# Patient Record
Sex: MALE | Race: WHITE | NOT HISPANIC OR LATINO | Employment: OTHER | URBAN - METROPOLITAN AREA
[De-identification: names, ages, dates, MRNs, and addresses within clinical notes are randomized per-mention and may not be internally consistent; named-entity substitution may affect disease eponyms.]

---

## 2017-12-04 ENCOUNTER — OFFICE VISIT (OUTPATIENT)
Dept: CARDIOLOGY | Facility: CLINIC | Age: 72
End: 2017-12-04
Attending: NURSE PRACTITIONER
Payer: COMMERCIAL

## 2017-12-04 VITALS
WEIGHT: 219.3 LBS | SYSTOLIC BLOOD PRESSURE: 102 MMHG | HEIGHT: 68 IN | BODY MASS INDEX: 33.24 KG/M2 | DIASTOLIC BLOOD PRESSURE: 64 MMHG | HEART RATE: 62 BPM

## 2017-12-04 DIAGNOSIS — E78.00 PURE HYPERCHOLESTEROLEMIA: ICD-10-CM

## 2017-12-04 DIAGNOSIS — I21.09 AMI ANTERIOR WALL (H): ICD-10-CM

## 2017-12-04 DIAGNOSIS — I25.10 CORONARY ARTERY DISEASE INVOLVING NATIVE CORONARY ARTERY OF NATIVE HEART WITHOUT ANGINA PECTORIS: ICD-10-CM

## 2017-12-04 DIAGNOSIS — I21.02 ST ELEVATION MYOCARDIAL INFARCTION INVOLVING LEFT ANTERIOR DESCENDING (LAD) CORONARY ARTERY (H): ICD-10-CM

## 2017-12-04 DIAGNOSIS — Z95.5 S/P PRIMARY ANGIOPLASTY WITH CORONARY STENT: ICD-10-CM

## 2017-12-04 DIAGNOSIS — I25.10 CORONARY ARTERY DISEASE INVOLVING NATIVE HEART WITHOUT ANGINA PECTORIS, UNSPECIFIED VESSEL OR LESION TYPE: ICD-10-CM

## 2017-12-04 DIAGNOSIS — I25.10 CORONARY ARTERY DISEASE INVOLVING NATIVE CORONARY ARTERY OF NATIVE HEART WITHOUT ANGINA PECTORIS: Primary | ICD-10-CM

## 2017-12-04 DIAGNOSIS — I25.5 CARDIOMYOPATHY, ISCHEMIC: ICD-10-CM

## 2017-12-04 LAB
ANION GAP SERPL CALCULATED.3IONS-SCNC: 6 MMOL/L (ref 3–14)
BUN SERPL-MCNC: 9 MG/DL (ref 7–30)
CALCIUM SERPL-MCNC: 8.9 MG/DL (ref 8.5–10.1)
CHLORIDE SERPL-SCNC: 101 MMOL/L (ref 94–109)
CHOLEST SERPL-MCNC: 170 MG/DL
CO2 SERPL-SCNC: 29 MMOL/L (ref 20–32)
CREAT SERPL-MCNC: 0.96 MG/DL (ref 0.66–1.25)
GFR SERPL CREATININE-BSD FRML MDRD: 76 ML/MIN/1.7M2
GLUCOSE SERPL-MCNC: 108 MG/DL (ref 70–99)
HDLC SERPL-MCNC: 43 MG/DL
LDLC SERPL CALC-MCNC: 88 MG/DL
NONHDLC SERPL-MCNC: 127 MG/DL
POTASSIUM SERPL-SCNC: 4.1 MMOL/L (ref 3.4–5.3)
SODIUM SERPL-SCNC: 136 MMOL/L (ref 133–144)
TRIGL SERPL-MCNC: 193 MG/DL

## 2017-12-04 PROCEDURE — 80048 BASIC METABOLIC PNL TOTAL CA: CPT | Performed by: INTERNAL MEDICINE

## 2017-12-04 PROCEDURE — 99214 OFFICE O/P EST MOD 30 MIN: CPT | Performed by: INTERNAL MEDICINE

## 2017-12-04 PROCEDURE — 36415 COLL VENOUS BLD VENIPUNCTURE: CPT | Performed by: INTERNAL MEDICINE

## 2017-12-04 PROCEDURE — 80061 LIPID PANEL: CPT | Performed by: INTERNAL MEDICINE

## 2017-12-04 NOTE — MR AVS SNAPSHOT
After Visit Summary   12/4/2017    Lion Chaudhari    MRN: [id number]           Patient Information     Date Of Birth          1945        Visit Information        Provider Department      12/4/2017 9:30 AM Grayson Kincaid MD [facility]        Today's Diagnoses     Coronary artery disease involving native coronary artery of native heart without angina pectoris    -  1    Cardiomyopathy, ischemic        AMI anterior wall (H)        S/P primary angioplasty with coronary stent        Pure hypercholesterolemia           Follow-ups after your visit        Additional Services     Follow-Up with Cardiologist                 Future tests that were ordered for you today     Open Future Orders        Priority Expected Expires Ordered    Lipid Profile Routine 12/4/2018 12/5/2018 12/4/2017    Basic metabolic panel Routine 12/4/2018 12/5/2018 12/4/2017    Exercise Stress Echocardiogram Routine 12/4/2018 12/5/2018 12/4/2017    Follow-Up with Cardiologist Routine 12/4/2018 12/5/2018 12/4/2017            Who to contact     If you have questions or need follow up information about today's clinic visit or your schedule please contact [facility] directly at [phone].  Normal or non-critical lab and imaging results will be communicated to you by MyChart, letter or phone within 4 business days after the clinic has received the results. If you do not hear from us within 7 days, please contact the clinic through [employer] or phone. If you have a critical or abnormal lab result, we will notify you by phone as soon as possible.  Submit refill requests through [employer] or call your pharmacy and they will forward the refill request to us. Please allow 3 business days for your refill to be completed.          Additional Information About Your Visit        MyChart Information     [employer] gives you secure access to your electronic  "health record. If you see a primary care provider, you can also send messages to your care team and make appointments. If you have questions, please call your primary care clinic.  If you do not have a primary care provider, please call [phone] and they will assist you.        Care EveryWhere ID     This is your Care EveryWhere ID. This could be used by other organizations to access your [facility] medical records  [id number]        Your Vitals Were     Pulse Height BMI (Body Mass Index)             62 1.727 m (5' 8\") 33.34 kg/m2          Blood Pressure from Last 3 Encounters:   12/04/17 102/64   12/02/16 110/76   10/29/15 114/68    Weight from Last 3 Encounters:   12/04/17 99.5 kg (219 lb 4.8 oz)   12/02/16 99.9 kg (220 lb 4.8 oz)   10/29/15 99.3 kg (219 lb)              We Performed the Following     Follow-Up with Cardiologist        Primary Care Provider Office Phone # Fax #    Jarett Jose [phone]       [facility] [address] [place] [address]        Equal Access to Services     [facility] WALE : Hadii aad ku hadasho Soomaali, waaxda luqadaha, qaybta kaalmada adeegyada, waxay ankurin haysamueln adeel bar. So [facility] [phone].    ATENCIÓN: Si habla español, tiene a morel disposición servicios gratuitos de asistencia lingüística. Llame al [phone].    We comply with applicable federal civil rights laws and [place] laws. We do not discriminate on the basis of race, color, national origin, age, disability, sex, sexual orientation, or gender identity.            Thank you!     Thank you for choosing [facility]  for your care. Our goal is always to provide you with excellent care. Hearing back from our patients is one way we can continue to improve our services. Please take a few minutes to complete the written survey that you may receive in the mail after your visit with us. Thank you!             Your Updated " Medication List - Protect others around you: Learn how to safely use, store and throw away your medicines at www.disposemymeds.org.          This list is accurate as of: 12/4/17 10:24 AM.  Always use your most recent med list.                   Brand Name Dispense Instructions for use Diagnosis    ampicillin 500 MG capsule    PRINCIPEN     Take 500 mg by mouth 4 tablets prior to dental appointments        aspirin 81 MG EC tablet     90 tablet    Take 1 tablet by mouth daily.    AMI anterior wall (H)       atorvastatin 40 MG tablet    LIPITOR    90 tablet    Take 1 tablet (40 mg) by mouth daily    Coronary artery disease involving native heart without angina pectoris, unspecified vessel or lesion type       carvedilol 6.25 MG tablet    COREG    180 tablet    Take 1 tablet (6.25 mg) by mouth 2 times daily    AMI anterior wall (H)       lisinopril 2.5 MG tablet    PRINIVIL/Zestril    90 tablet    Take 1 tablet (2.5 mg) by mouth daily    AMI anterior wall (H)       nitroGLYcerin 0.4 MG sublingual tablet    NITROSTAT    25 tablet    Place 1 tablet (0.4 mg) under the tongue every 5 minutes as needed for chest pain    CAD (coronary artery disease)

## 2017-12-04 NOTE — LETTER
12/4/2017    Jarett Jose MD  [facility]   [address]   [place] [address]    RE: Lion Chaudhari       Dear Colleague,    I had the pleasure of seeing Lion Chaudhari in the [facility].    HPI and Plan:   This 72-year-old gentleman is seen in follow-up of his coronary artery disease, ischemic cardiac myopathy, dyslipidemia, and history of anterior MI in 2012.   He states he has continued to feel well this year. He remains off cigarettes completely. With activities, he is having no chest discomfort or other ischemic symptoms, no dyspnea and is without orthopnea, edema, palpitations, dizziness, or syncope. He denies myalgias or muscle weakness. He is without claudication or focal neurologic symptoms.   Estrace workouts were tailing off as he was having troubles with right hip pain.  He states that is now resolved and he is willing to go back to the gym and start working out more.  However when he does yard work or any projects that he wants to do he does not feel like he has limitations.    In 2012 he presented with an acute anterior myocardial infarction although somewhat late. He was taken emergently to the cath lab and had successful stenting and PCI of a culprit lesion in the mid LAD. He was left at that time with an ejection fraction around 40%-45% following that. At the time of angiography he had about a 70% marginal lesion and mild right coronary disease. He has not had symptomatic congestive heart failure ever.  He states he is tolerating his medications well.  He gained about 15-17 pounds in [place]/year.  He just recently embarked upon a better diet and has lost most of this.     Exam-full exam below.  In summary:  Blood pressure 102/64.  Pulse 60 and regular  , normal cardiopulmonary exam and no evidence of volume overload. Peripheral vascular exam is normal.  Full exam detailed below.    Follow-up labs are reviewed with him.  At one time his LDL was  down in the mid 70s.  It popped up to 97 last year and is now 88 this year.  I looked back on his outside labs and in 2008 his LDLs were running around 190 and total cholesterols around 280.  I have encouraged him to continue his current lifestyle and lose another 10 pounds and I think we will have his LDLs back in the 70s which is excellent profile given his baseline.  Electrolytes and renal function are stable and normal.     Impression/plan  1-coronary artery disease. Currently without ischemic symptoms. History of anterior MI treated with drug-eluting stent in 2012. Doing appropriate risk factor intervention as above. Have recommended continued current regimen.when he returns any year I will get a stress study set will be 6 years since his event.  2-ischemic cardiomyopathy. Ejection fraction around 45%. No heart failure, arrhythmia, or other symptoms, and no evidence of volume overload. He will continue on carvedilol and vasodilator therapy.   3-hyperlipidemia.  At  reasonablegoal on atorvastatin  4-risk factor intervention. Exercise program details were reviewed and reiterated.  Dietary reviews suggest a good understanding and a fairly good compliance with quality of diet. We discussed the importance of quantity also. He remains abstinent cigarettes. Blood pressure is controlled.        I will have him continue his current medical regimen.  I will have him back in one year for follow-up.  He knows to call and come in sooner if any new issues arise. Labs and stress study will be evaluated at that time.    Orders Placed This Encounter   Procedures     Lipid Profile     Basic metabolic panel     Follow-Up with Cardiologist     Exercise Stress Echocardiogram       No orders of the defined types were placed in this encounter.      There are no discontinued medications.      Encounter Diagnoses   Name Primary?     Coronary artery disease involving native coronary artery of native heart without angina pectoris Yes      Cardiomyopathy, ischemic      AMI anterior wall (H)      S/P primary angioplasty with coronary stent      Pure hypercholesterolemia        CURRENT MEDICATIONS:  Current Outpatient Prescriptions   Medication Sig Dispense Refill     carvedilol (COREG) 6.25 MG tablet Take 1 tablet (6.25 mg) by mouth 2 times daily 180 tablet 3     lisinopril (PRINIVIL/ZESTRIL) 2.5 MG tablet Take 1 tablet (2.5 mg) by mouth daily 90 tablet 3     atorvastatin (LIPITOR) 40 MG tablet Take 1 tablet (40 mg) by mouth daily 90 tablet 3     ampicillin (PRINCIPEN) 500 MG capsule Take 500 mg by mouth 4 tablets prior to dental appointments       nitroglycerin (NITROSTAT) 0.4 MG SL tablet Place 1 tablet (0.4 mg) under the tongue every 5 minutes as needed for chest pain 25 tablet 3     aspirin EC 81 MG EC tablet Take 1 tablet by mouth daily. 90 tablet 3       ALLERGIES     Allergies   Allergen Reactions     Clindamycin Hcl Rash     Warfarin Rash       PAST MEDICAL HISTORY:  Past Medical History:   Diagnosis Date     Cardiomyopathy, ischemic      Coronary artery disease     MI 6/12/12 with thrombectomy and drug eluding STENT to LAD     Hydrocele      Hyperlipidaemia      Myocardial infarction 6/2012    Anterior       PAST SURGICAL HISTORY:  Past Surgical History:   Procedure Laterality Date     HEART CATH, ANGIOPLASTY  6/2012    JULIA stenting to LAD     HEART CATH, ANGIOPLASTY  7/2012    taged cath JULIA stenting to CFX     HERNIA REPAIR       ORTHOPEDIC SURGERY      Left hip replacement       FAMILY HISTORY:  Family History   Problem Relation Age of Onset     Hypertension Father      Prostate Cancer Father      Ovarian Cancer Mother        SOCIAL HISTORY:  Social History     Social History     Marital status:      Spouse name: N/A     Number of children: N/A     Years of education: N/A     Social History Main Topics     Smoking status: Former Smoker     Quit date: 12/12/2012     Smokeless tobacco: Former User      Comment: Vaping currently      "Alcohol use 0.0 oz/week     0 Standard drinks or equivalent per week      Comment: occasionally     Drug use: No     Sexual activity: Yes     Partners: Female     Other Topics Concern     Caffeine Concern No     2-3 cups per day     Stress Concern No     Special Diet Yes     trying to eat healthier - lower carbs     Exercise Yes     walking-  3-4 times per week     Social History Narrative       Review of Systems:  Skin:  Negative       Eyes:  Positive for glasses reading glasses  ENT:  Positive for hearing loss;tinnitus;sinus trouble partial hearing loss, blows nose every am  Respiratory:  Positive for dyspnea on exertion     Cardiovascular:    Positive for;edema    Gastroenterology: Negative      Genitourinary:  Positive for urinary frequency;hesitancy    Musculoskeletal:  Positive for   left hip replace 2011  Neurologic:  Negative      Psychiatric:  Negative      Heme/Lymph/Imm:  Positive for allergies RX allergies - possible seasonal  Endocrine:  Negative        Physical Exam:  Vitals: /64 (BP Location: Right arm, Patient Position: Chair, Cuff Size: Adult Large)  Pulse 62  Ht 1.727 m (5' 8\")  Wt 99.5 kg (219 lb 4.8 oz)  BMI 33.34 kg/m2    Constitutional:  cooperative, alert and oriented, well developed, well nourished, in no acute distress        Skin:  warm and dry to the touch, no apparent skin lesions or masses noted          Head:  normocephalic, no masses or lesions        Eyes:  pupils equal and round;sclera white;EOMS intact;conjunctivae and lids unremarkable        Lymph:      ENT:  no pallor or cyanosis        Neck:  carotid pulses are full and equal bilaterally, JVP normal, no carotid bruit        Respiratory:  normal breath sounds, clear to auscultation, normal A-P diameter, normal symmetry, normal respiratory excursion, no use of accessory muscles         Cardiac: regular rhythm, normal S1/S2, no S3 or S4, apical impulse not displaced, no murmurs, gallops or rubs                pulses full " and equal                                        GI:  abdomen soft, non-tender, BS normoactive, no mass, no HSM, no bruits obese      Extremities and Muscular Skeletal:  no deformities, clubbing, cyanosis, erythema observed;no edema              Neurological:  no gross motor deficits;affect appropriate        Psych:  Alert and Oriented x 3      Thank you for allowing me to participate in the care of your patient.    Sincerely,     Grayson Kincaid MD     [facility]

## 2017-12-04 NOTE — PROGRESS NOTES
HPI and Plan:   This 72-year-old gentleman is seen in follow-up of his coronary artery disease, ischemic cardiac myopathy, dyslipidemia, and history of anterior MI in 2012.   He states he has continued to feel well this year. He remains off cigarettes completely. With activities, he is having no chest discomfort or other ischemic symptoms, no dyspnea and is without orthopnea, edema, palpitations, dizziness, or syncope. He denies myalgias or muscle weakness. He is without claudication or focal neurologic symptoms.  Estrace workouts were tailing off as he was having troubles with right hip pain.  He states that is now resolved and he is willing to go back to the gym and start working out more.  However when he does yard work or any projects that he wants to do he does not feel like he has limitations.    In 2012 he presented with an acute anterior myocardial infarction although somewhat late. He was taken emergently to the cath lab and had successful stenting and PCI of a culprit lesion in the mid LAD. He was left at that time with an ejection fraction around 40%-45% following that. At the time of angiography he had about a 70% marginal lesion and mild right coronary disease. He has not had symptomatic congestive heart failure ever.  He states he is tolerating his medications well.  He gained about 15-17 pounds in [place]/year.  He just recently embarked upon a better diet and has lost most of this.     Exam-full exam below.  In summary:  Blood pressure 102/64.  Pulse 60 and regular  , normal cardiopulmonary exam and no evidence of volume overload. Peripheral vascular exam is normal.  Full exam detailed below.    Follow-up labs are reviewed with him.  At one time his LDL was down in the mid 70s.  It popped up to 97 last year and is now 88 this year.  I looked back on his outside labs and in 2008 his LDLs were running around 190 and total cholesterols around 280.  I have encouraged him to continue his current lifestyle  and lose another 10 pounds and I think we will have his LDLs back in the 70s which is excellent profile given his baseline.  Electrolytes and renal function are stable and normal.     Impression/plan  1-coronary artery disease. Currently without ischemic symptoms. History of anterior MI treated with drug-eluting stent in 2012. Doing appropriate risk factor intervention as above. Have recommended continued current regimen.when he returns any year I will get a stress study set will be 6 years since his event.  2-ischemic cardiomyopathy. Ejection fraction around 45%. No heart failure, arrhythmia, or other symptoms, and no evidence of volume overload. He will continue on carvedilol and vasodilator therapy.   3-hyperlipidemia.  At reasonablegoal on atorvastatin  4-risk factor intervention. Exercise program details were reviewed and reiterated.  Dietary reviews suggest a good understanding and a fairly good compliance with quality of diet. We discussed the importance of quantity also. He remains abstinent cigarettes. Blood pressure is controlled.        I will have him continue his current medical regimen.  I will have him back in one year for follow-up.  He knows to call and come in sooner if any new issues arise. Labs and stress study will be evaluated at that time.    Orders Placed This Encounter   Procedures     Lipid Profile     Basic metabolic panel     Follow-Up with Cardiologist     Exercise Stress Echocardiogram       No orders of the defined types were placed in this encounter.      There are no discontinued medications.      Encounter Diagnoses   Name Primary?     Coronary artery disease involving native coronary artery of native heart without angina pectoris Yes     Cardiomyopathy, ischemic      AMI anterior wall (H)      S/P primary angioplasty with coronary stent      Pure hypercholesterolemia        CURRENT MEDICATIONS:  Current Outpatient Prescriptions   Medication Sig Dispense Refill     carvedilol  (COREG) 6.25 MG tablet Take 1 tablet (6.25 mg) by mouth 2 times daily 180 tablet 3     lisinopril (PRINIVIL/ZESTRIL) 2.5 MG tablet Take 1 tablet (2.5 mg) by mouth daily 90 tablet 3     atorvastatin (LIPITOR) 40 MG tablet Take 1 tablet (40 mg) by mouth daily 90 tablet 3     ampicillin (PRINCIPEN) 500 MG capsule Take 500 mg by mouth 4 tablets prior to dental appointments       nitroglycerin (NITROSTAT) 0.4 MG SL tablet Place 1 tablet (0.4 mg) under the tongue every 5 minutes as needed for chest pain 25 tablet 3     aspirin EC 81 MG EC tablet Take 1 tablet by mouth daily. 90 tablet 3       ALLERGIES     Allergies   Allergen Reactions     Clindamycin Hcl Rash     Warfarin Rash       PAST MEDICAL HISTORY:  Past Medical History:   Diagnosis Date     Cardiomyopathy, ischemic      Coronary artery disease     MI 6/12/12 with thrombectomy and drug eluding STENT to LAD     Hydrocele      Hyperlipidaemia      Myocardial infarction 6/2012    Anterior       PAST SURGICAL HISTORY:  Past Surgical History:   Procedure Laterality Date     HEART CATH, ANGIOPLASTY  6/2012    JULIA stenting to LAD     HEART CATH, ANGIOPLASTY  7/2012    taged cath JULIA stenting to CFX     HERNIA REPAIR       ORTHOPEDIC SURGERY      Left hip replacement       FAMILY HISTORY:  Family History   Problem Relation Age of Onset     Hypertension Father      Prostate Cancer Father      Ovarian Cancer Mother        SOCIAL HISTORY:  Social History     Social History     Marital status:      Spouse name: N/A     Number of children: N/A     Years of education: N/A     Social History Main Topics     Smoking status: Former Smoker     Quit date: 12/12/2012     Smokeless tobacco: Former User      Comment: Vaping currently     Alcohol use 0.0 oz/week     0 Standard drinks or equivalent per week      Comment: occasionally     Drug use: No     Sexual activity: Yes     Partners: Female     Other Topics Concern     Caffeine Concern No     2-3 cups per day     Stress  "Concern No     Special Diet Yes     trying to eat healthier - lower carbs     Exercise Yes     walking-  3-4 times per week     Social History Narrative       Review of Systems:  Skin:  Negative       Eyes:  Positive for glasses reading glasses  ENT:  Positive for hearing loss;tinnitus;sinus trouble partial hearing loss, blows nose every am  Respiratory:  Positive for dyspnea on exertion     Cardiovascular:    Positive for;edema    Gastroenterology: Negative      Genitourinary:  Positive for urinary frequency;hesitancy    Musculoskeletal:  Positive for   left hip replace 2011  Neurologic:  Negative      Psychiatric:  Negative      Heme/Lymph/Imm:  Positive for allergies RX allergies - possible seasonal  Endocrine:  Negative        Physical Exam:  Vitals: /64 (BP Location: Right arm, Patient Position: Chair, Cuff Size: Adult Large)  Pulse 62  Ht 1.727 m (5' 8\")  Wt 99.5 kg (219 lb 4.8 oz)  BMI 33.34 kg/m2    Constitutional:  cooperative, alert and oriented, well developed, well nourished, in no acute distress        Skin:  warm and dry to the touch, no apparent skin lesions or masses noted          Head:  normocephalic, no masses or lesions        Eyes:  pupils equal and round;sclera white;EOMS intact;conjunctivae and lids unremarkable        Lymph:      ENT:  no pallor or cyanosis        Neck:  carotid pulses are full and equal bilaterally, JVP normal, no carotid bruit        Respiratory:  normal breath sounds, clear to auscultation, normal A-P diameter, normal symmetry, normal respiratory excursion, no use of accessory muscles         Cardiac: regular rhythm, normal S1/S2, no S3 or S4, apical impulse not displaced, no murmurs, gallops or rubs                pulses full and equal                                        GI:  abdomen soft, non-tender, BS normoactive, no mass, no HSM, no bruits obese      Extremities and Muscular Skeletal:  no deformities, clubbing, cyanosis, erythema observed;no edema         "      Neurological:  no gross motor deficits;affect appropriate        Psych:  Alert and Oriented x 3        CC  ELSA Eugene CNP  [address]  [place] [address]

## 2017-12-22 DIAGNOSIS — I21.09 AMI ANTERIOR WALL (H): ICD-10-CM

## 2017-12-22 DIAGNOSIS — I25.10 CORONARY ARTERY DISEASE INVOLVING NATIVE HEART WITHOUT ANGINA PECTORIS, UNSPECIFIED VESSEL OR LESION TYPE: ICD-10-CM

## 2017-12-22 RX ORDER — CARVEDILOL 6.25 MG/1
6.25 TABLET ORAL 2 TIMES DAILY
Qty: 180 TABLET | Refills: 3 | Status: SHIPPED | OUTPATIENT
Start: 2017-12-22 | End: 2018-12-12

## 2017-12-22 RX ORDER — LISINOPRIL 2.5 MG/1
2.5 TABLET ORAL DAILY
Qty: 90 TABLET | Refills: 3 | Status: SHIPPED | OUTPATIENT
Start: 2017-12-22 | End: 2018-12-12

## 2017-12-22 RX ORDER — ATORVASTATIN CALCIUM 40 MG/1
40 TABLET, FILM COATED ORAL DAILY
Qty: 90 TABLET | Refills: 3 | Status: SHIPPED | OUTPATIENT
Start: 2017-12-22 | End: 2018-12-19

## 2017-12-22 NOTE — TELEPHONE ENCOUNTER
Medication Refilled: atorvastatin and carvedilol - received fax refill request  Last office visit: 12/4/17  Next office visit: 12/2018  Pharmacy sent to: [facility]  Amanda NATHAN

## 2018-12-12 DIAGNOSIS — I21.09 AMI ANTERIOR WALL (H): ICD-10-CM

## 2018-12-12 RX ORDER — CARVEDILOL 6.25 MG/1
6.25 TABLET ORAL 2 TIMES DAILY
Qty: 180 TABLET | Refills: 1 | Status: SHIPPED | OUTPATIENT
Start: 2018-12-12 | End: 2019-03-01

## 2018-12-12 RX ORDER — LISINOPRIL 2.5 MG/1
2.5 TABLET ORAL DAILY
Qty: 90 TABLET | Refills: 1 | Status: SHIPPED | OUTPATIENT
Start: 2018-12-12 | End: 2019-03-01

## 2018-12-19 DIAGNOSIS — I25.10 CORONARY ARTERY DISEASE INVOLVING NATIVE HEART WITHOUT ANGINA PECTORIS, UNSPECIFIED VESSEL OR LESION TYPE: ICD-10-CM

## 2018-12-19 RX ORDER — ATORVASTATIN CALCIUM 40 MG/1
40 TABLET, FILM COATED ORAL DAILY
Qty: 90 TABLET | Refills: 0 | Status: SHIPPED | OUTPATIENT
Start: 2018-12-19 | End: 2019-03-01

## 2019-03-01 ENCOUNTER — OFFICE VISIT (OUTPATIENT)
Dept: CARDIOLOGY | Facility: CLINIC | Age: 74
End: 2019-03-01
Payer: COMMERCIAL

## 2019-03-01 ENCOUNTER — APPOINTMENT (OUTPATIENT)
Dept: LAB | Facility: CLINIC | Age: 74
End: 2019-03-01
Payer: COMMERCIAL

## 2019-03-01 VITALS
HEIGHT: 68 IN | WEIGHT: 219.2 LBS | DIASTOLIC BLOOD PRESSURE: 64 MMHG | BODY MASS INDEX: 33.22 KG/M2 | SYSTOLIC BLOOD PRESSURE: 110 MMHG | HEART RATE: 60 BPM

## 2019-03-01 DIAGNOSIS — I25.5 CARDIOMYOPATHY, ISCHEMIC: ICD-10-CM

## 2019-03-01 DIAGNOSIS — I25.10 CORONARY ARTERY DISEASE INVOLVING NATIVE CORONARY ARTERY OF NATIVE HEART WITHOUT ANGINA PECTORIS: ICD-10-CM

## 2019-03-01 DIAGNOSIS — E78.00 PURE HYPERCHOLESTEROLEMIA: ICD-10-CM

## 2019-03-01 DIAGNOSIS — I87.2 VENOUS (PERIPHERAL) INSUFFICIENCY: ICD-10-CM

## 2019-03-01 DIAGNOSIS — I25.10 CORONARY ARTERY DISEASE INVOLVING NATIVE HEART WITHOUT ANGINA PECTORIS, UNSPECIFIED VESSEL OR LESION TYPE: Primary | ICD-10-CM

## 2019-03-01 DIAGNOSIS — I21.09 AMI ANTERIOR WALL (H): ICD-10-CM

## 2019-03-01 LAB
ANION GAP SERPL CALCULATED.3IONS-SCNC: 5 MMOL/L (ref 3–14)
BUN SERPL-MCNC: 14 MG/DL (ref 7–30)
CALCIUM SERPL-MCNC: 8.5 MG/DL (ref 8.5–10.1)
CHLORIDE SERPL-SCNC: 107 MMOL/L (ref 94–109)
CHOLEST SERPL-MCNC: 167 MG/DL
CO2 SERPL-SCNC: 27 MMOL/L (ref 20–32)
CREAT SERPL-MCNC: 0.78 MG/DL (ref 0.66–1.25)
GFR SERPL CREATININE-BSD FRML MDRD: 88 ML/MIN/{1.73_M2}
GLUCOSE SERPL-MCNC: 107 MG/DL (ref 70–99)
HDLC SERPL-MCNC: 40 MG/DL
LDLC SERPL CALC-MCNC: 85 MG/DL
NONHDLC SERPL-MCNC: 127 MG/DL
POTASSIUM SERPL-SCNC: 4.2 MMOL/L (ref 3.4–5.3)
SODIUM SERPL-SCNC: 139 MMOL/L (ref 133–144)
TRIGL SERPL-MCNC: 211 MG/DL

## 2019-03-01 PROCEDURE — 80048 BASIC METABOLIC PNL TOTAL CA: CPT | Performed by: INTERNAL MEDICINE

## 2019-03-01 PROCEDURE — 99214 OFFICE O/P EST MOD 30 MIN: CPT | Performed by: INTERNAL MEDICINE

## 2019-03-01 PROCEDURE — 80061 LIPID PANEL: CPT | Performed by: INTERNAL MEDICINE

## 2019-03-01 PROCEDURE — 36415 COLL VENOUS BLD VENIPUNCTURE: CPT | Performed by: INTERNAL MEDICINE

## 2019-03-01 RX ORDER — CARVEDILOL 6.25 MG/1
6.25 TABLET ORAL 2 TIMES DAILY
Qty: 180 TABLET | Refills: 3 | Status: SHIPPED | OUTPATIENT
Start: 2019-03-01 | End: 2020-04-02

## 2019-03-01 RX ORDER — ATORVASTATIN CALCIUM 40 MG/1
40 TABLET, FILM COATED ORAL DAILY
Qty: 90 TABLET | Refills: 3 | Status: SHIPPED | OUTPATIENT
Start: 2019-03-01 | End: 2020-04-02

## 2019-03-01 RX ORDER — LISINOPRIL 2.5 MG/1
2.5 TABLET ORAL DAILY
Qty: 90 TABLET | Refills: 3 | Status: SHIPPED | OUTPATIENT
Start: 2019-03-01 | End: 2020-04-02

## 2019-03-01 ASSESSMENT — MIFFLIN-ST. JEOR: SCORE: 1703.78

## 2019-03-01 NOTE — LETTER
3/1/2019    Jarett Jose MD  [facility] [address]   [place] [address]    RE: Lion Chaudhari       Dear Colleague,    I had the pleasure of seeing Lion AJ Chaudhari in the [facility].    HPI and Plan:   This 73-year-old gentleman is seen in follow-up of his coronary artery disease, ischemic cardiomyopathy, dyslipidemia, and history of anterior MI in 2012.     He states he has continued to feel well this year.  Last year he injured his shoulder and required repair.  Prior to that his surgeon requested a stress study and at [facility] he had a stress nuclear study which showed previously known small anterior infarct, no evidence of ischemia, ejection fraction actually greater than 50%.  He had uncomplicated surgery following that.  He is continued to have very slow rehab of his shoulder.    He remains off cigarettes completely. With activities, he is having no chest discomfort or other ischemic symptoms, no dyspnea and is without orthopnea, edema, palpitations, dizziness, or syncope. He denies myalgias or muscle weakness. He is without claudication or focal neurologic symptoms.   He is noting discoloration in his ankles although he is not sure he is having swelling.  He is not having pain.  He notes that he has had some recent scratches and trauma and is taken a long time to heal in his lower pretibial region but he is not having evidence of infections.     In 2012 he presented with an acute anterior myocardial infarction although somewhat late. He was taken emergently to the cath lab and had successful stenting and PCI of a culprit lesion in the mid LAD. He was left at that time with an ejection fraction around 40%-45% following that. At the time of angiography he also had a significant/severe proximal circumflex lesion.  There was only mild right coronary disease.    He subsequent had staged stenting of the proximal circumflex.  He has not had symptomatic  congestive heart failure ever.  He states he is tolerating his medications well.       Exam-full exam below.  In summary:  Blood pressure 110/64 Pulse 60 and regular  -normal cardiopulmonary exam and no evidence of volume overload. Peripheral vascular exam is normal.  -There are chronic stasis changes pretibially bilaterally two thirds of the way.  There is underlying 2+ pitting edema FPC up bilaterally.  No ulcerations or cellulitis.  Full exam detailed below.     Laboratory studies today show his electrolytes and renal function remain normal.  Lipids show LDL in the mid 80s which is mostly where he has been for the last few years.  Triglycerides are mildly elevated.  We did discuss triglyceride lowering diet and lifestyle.  He understands importance of weight loss.  He is juicing now and getting lots of fiber from the way he describes it.     Impression/plan    1-coronary artery disease. Currently without ischemic symptoms. History of anterior MI treated with drug-eluting stent in 2012. Doing appropriate risk factor intervention as above. Have recommended continued current regimen.negative stress study in 2017.    2-ischemic cardiomyopathy. Ejection fraction around 45-50 %. No heart failure, arrhythmia, or other symptoms, and no evidence of volume overload. He will continue on carvedilol and vasodilator therapy.     3-hyperlipidemia.  At reasonablegoal on atorvastatin.  Triglyceride lowering approach was recommended and discussed.    4-risk factor intervention. Exercise program details were reviewed and reiterated.  Dietary reviews suggest a good understanding and a fairly good compliance with quality of diet. We discussed the importance of quantity also. He remains abstinent cigarettes. Blood pressure is controlled.    5-peripheral venous insufficiency.  Significant skin changes now.  We spent extensive time discussing control with compression stockings and I gave him a number of resources for this.      I  will have him continue his current medical regimen and have renewed his cardiovascular medications for the next year.  I will have him back in one year for follow-up.  He knows to call and come in sooner if any new issues arise.    Orders Placed This Encounter   Procedures     Basic metabolic panel     Lipid Profile     ALT     Follow-Up with Cardiologist       Orders Placed This Encounter   Medications     carvedilol (COREG) 6.25 MG tablet     Sig: Take 1 tablet (6.25 mg) by mouth 2 times daily     Dispense:  180 tablet     Refill:  3     lisinopril (PRINIVIL/ZESTRIL) 2.5 MG tablet     Sig: Take 1 tablet (2.5 mg) by mouth daily     Dispense:  90 tablet     Refill:  3     atorvastatin (LIPITOR) 40 MG tablet     Sig: Take 1 tablet (40 mg) by mouth daily     Dispense:  90 tablet     Refill:  3       Medications Discontinued During This Encounter   Medication Reason     carvedilol (COREG) 6.25 MG tablet Reorder     lisinopril (PRINIVIL/ZESTRIL) 2.5 MG tablet Reorder     atorvastatin (LIPITOR) 40 MG tablet Reorder         Encounter Diagnoses   Name Primary?     Coronary artery disease involving native heart without angina pectoris, unspecified vessel or lesion type Yes     Cardiomyopathy, ischemic      Pure hypercholesterolemia      AMI anterior wall (H)      Venous (peripheral) insufficiency        CURRENT MEDICATIONS:  Current Outpatient Medications   Medication Sig Dispense Refill     ampicillin (PRINCIPEN) 500 MG capsule Take 500 mg by mouth 4 tablets prior to dental appointments       aspirin EC 81 MG EC tablet Take 1 tablet by mouth daily. 90 tablet 3     atorvastatin (LIPITOR) 40 MG tablet Take 1 tablet (40 mg) by mouth daily 90 tablet 3     carvedilol (COREG) 6.25 MG tablet Take 1 tablet (6.25 mg) by mouth 2 times daily 180 tablet 3     lisinopril (PRINIVIL/ZESTRIL) 2.5 MG tablet Take 1 tablet (2.5 mg) by mouth daily 90 tablet 3     nitroglycerin (NITROSTAT) 0.4 MG SL tablet Place 1 tablet (0.4 mg) under the  tongue every 5 minutes as needed for chest pain 25 tablet 3       ALLERGIES     Allergies   Allergen Reactions     Clindamycin Hcl Rash     Hydrocodone-Acetaminophen Itching     Warfarin Rash       PAST MEDICAL HISTORY:  Past Medical History:   Diagnosis Date     AMI anterior wall (H) 2012     CAD (coronary artery disease) 2012 - Anterior MI with Thrombectomy, PCI and JULIA to LAD 2012 - PCI and JULIA to LCx      Cardiomyopathy, ischemic      Coronary artery disease     MI 12 with thrombectomy and drug eluding STENT to LAD     Hydrocele      Hyperlipidaemia      Hyperlipidemia      Diagnosis updated by automated process. Provider to review and confirm.     Myocardial infarction (H) 2012    Anterior     S/P primary angioplasty with coronary stent 2012       PAST SURGICAL HISTORY:  Past Surgical History:   Procedure Laterality Date     HEART CATH, ANGIOPLASTY  2012    JULIA stenting to LAD     HEART CATH, ANGIOPLASTY  2012    taged cath JULIA stenting to CFX     HERNIA REPAIR       ORTHOPEDIC SURGERY      Left hip replacement       FAMILY HISTORY:  Family History   Problem Relation Age of Onset     Hypertension Father      Prostate Cancer Father      Ovarian Cancer Mother        SOCIAL HISTORY:  Social History     Socioeconomic History     Marital status:      Spouse name: None     Number of children: None     Years of education: None     Highest education level: None   Occupational History     None   Social Needs     Financial resource strain: None     Food insecurity:     Worry: None     Inability: None     Transportation needs:     Medical: None     Non-medical: None   Tobacco Use     Smoking status: Former Smoker     Last attempt to quit: 2012     Years since quittin.2     Smokeless tobacco: Former User     Tobacco comment: Vaping currently   Substance and Sexual Activity     Alcohol use: Yes     Alcohol/week: 0.0 oz     Comment: occasionally     Drug use: No      "Sexual activity: Yes     Partners: Female   Lifestyle     Physical activity:     Days per week: None     Minutes per session: None     Stress: None   Relationships     Social connections:     Talks on phone: None     Gets together: None     Attends Pentecostalism service: None     Active member of club or organization: None     Attends meetings of clubs or organizations: None     Relationship status: None     Intimate partner violence:     Fear of current or ex partner: None     Emotionally abused: None     Physically abused: None     Forced sexual activity: None   Other Topics Concern     Parent/sibling w/ CABG, MI or angioplasty before 65F 55M? Not Asked      Service Not Asked     Blood Transfusions Not Asked     Caffeine Concern No     Comment: 2-3 cups per day     Occupational Exposure Not Asked     Hobby Hazards Not Asked     Sleep Concern Not Asked     Stress Concern No     Weight Concern Not Asked     Special Diet Yes     Comment: trying to eat healthier - lower carbs     Back Care Not Asked     Exercise Yes     Comment: walking-  3-4 times per week     Bike Helmet Not Asked     Seat Belt Not Asked     Self-Exams Not Asked   Social History Narrative     None       Review of Systems:  Skin:  Negative       Eyes:  Positive for glasses reading glasses  ENT:  Positive for hearing loss    Respiratory:  Negative       Cardiovascular:  Negative      Gastroenterology: Negative      Genitourinary:         Musculoskeletal:  Positive for back pain;neck pain;joint pain;arthritis    Neurologic:  Negative      Psychiatric:  Negative      Heme/Lymph/Imm:  Positive for allergies RX allergies - possible seasonal  Endocrine:  Negative        Physical Exam:  Vitals: /64 (BP Location: Right arm, Patient Position: Sitting, Cuff Size: Adult Large)   Pulse 60   Ht 1.727 m (5' 8\")   Wt 99.4 kg (219 lb 3.2 oz)   BMI 33.33 kg/m       Constitutional:  cooperative, alert and oriented, well developed, well nourished, in no " acute distress        Skin:  warm and dry to the touch, no apparent skin lesions or masses noted          Head:  normocephalic, no masses or lesions        Eyes:  pupils equal and round;sclera white;EOMS intact;conjunctivae and lids unremarkable        Lymph:      ENT:  no pallor or cyanosis        Neck:  carotid pulses are full and equal bilaterally, JVP normal, no carotid bruit        Respiratory:  normal breath sounds, clear to auscultation, normal A-P diameter, normal symmetry, normal respiratory excursion, no use of accessory muscles         Cardiac: regular rhythm, normal S1/S2, no S3 or S4, apical impulse not displaced, no murmurs, gallops or rubs                pulses full and equal                                        GI:  abdomen soft;no HSM;non-tender;no bruits obese      Extremities and Muscular Skeletal:  no deformities, clubbing, cyanosis, erythema observed stasis pigmentation bilateral LE edema;pitting;2+          Neurological:  no gross motor deficits;affect appropriate        Psych:  Alert and Oriented x 3          Thank you for allowing me to participate in the care of your patient.    Sincerely,     Grayson Kincaid MD     [facility]

## 2019-03-01 NOTE — LETTER
3/1/2019    Jarett Jose MD  [facility] [address]   [place] [address]    RE: Lion Chaudhari       Dear Colleague,    I had the pleasure of seeing Lion AJ Chaudhari in the [facility].    HPI and Plan:   This 73-year-old gentleman is seen in follow-up of his coronary artery disease, ischemic cardiomyopathy, dyslipidemia, and history of anterior MI in 2012.     He states he has continued to feel well this year.  Last year he injured his shoulder and required repair.  Prior to that his surgeon requested a stress study and at [facility] [place] operated by [facility] he had a stress nuclear study which showed previously known small anterior infarct, no evidence of ischemia, ejection fraction actually greater than 50%.  He had uncomplicated surgery following that.  He is continued to have very slow rehab of his shoulder.    He remains off cigarettes completely. With activities, he is having no chest discomfort or other ischemic symptoms, no dyspnea and is without orthopnea, edema, palpitations, dizziness, or syncope. He denies myalgias or muscle weakness. He is without claudication or focal neurologic symptoms.   He is noting discoloration in his ankles although he is not sure he is having swelling.  He is not having pain.  He notes that he has had some recent scratches and trauma and is taken a long time to heal in his lower pretibial region but he is not having evidence of infections.     In 2012 he presented with an acute anterior myocardial infarction although somewhat late. He was taken emergently to the cath lab and had successful stenting and PCI of a culprit lesion in the mid LAD. He was left at that time with an ejection fraction around 40%-45% following that. At the time of angiography he also had a significant/severe proximal circumflex lesion.  There was only mild right coronary disease.    He subsequent had staged stenting of the proximal circumflex.  He has not had symptomatic  congestive heart failure ever.  He states he is tolerating his medications well.       Exam-full exam below.  In summary:  Blood pressure 110/64 Pulse 60 and regular  -normal cardiopulmonary exam and no evidence of volume overload. Peripheral vascular exam is normal.  -There are chronic stasis changes pretibially bilaterally two thirds of the way.  There is underlying 2+ pitting edema CHCF up bilaterally.  No ulcerations or cellulitis.  Full exam detailed below.     Laboratory studies today show his electrolytes and renal function remain normal.  Lipids show LDL in the mid 80s which is mostly where he has been for the last few years.  Triglycerides are mildly elevated.  We did discuss triglyceride lowering diet and lifestyle.  He understands importance of weight loss.  He is juicing now and getting lots of fiber from the way he describes it.     Impression/plan    1-coronary artery disease. Currently without ischemic symptoms. History of anterior MI treated with drug-eluting stent in 2012. Doing appropriate risk factor intervention as above. Have recommended continued current regimen.negative stress study in 2017.    2-ischemic cardiomyopathy. Ejection fraction around 45-50 %. No heart failure, arrhythmia, or other symptoms, and no evidence of volume overload. He will continue on carvedilol and vasodilator therapy.     3-hyperlipidemia.  At reasonablegoal on atorvastatin.  Triglyceride lowering approach was recommended and discussed.    4-risk factor intervention. Exercise program details were reviewed and reiterated.  Dietary reviews suggest a good understanding and a fairly good compliance with quality of diet. We discussed the importance of quantity also. He remains abstinent cigarettes. Blood pressure is controlled.    5-peripheral venous insufficiency.  Significant skin changes now.  We spent extensive time discussing control with compression stockings and I gave him a number of resources for this.      I  will have him continue his current medical regimen and have renewed his cardiovascular medications for the next year.  I will have him back in one year for follow-up.  He knows to call and come in sooner if any new issues arise.    Orders Placed This Encounter   Procedures     Basic metabolic panel     Lipid Profile     ALT     Follow-Up with Cardiologist       Orders Placed This Encounter   Medications     carvedilol (COREG) 6.25 MG tablet     Sig: Take 1 tablet (6.25 mg) by mouth 2 times daily     Dispense:  180 tablet     Refill:  3     lisinopril (PRINIVIL/ZESTRIL) 2.5 MG tablet     Sig: Take 1 tablet (2.5 mg) by mouth daily     Dispense:  90 tablet     Refill:  3     atorvastatin (LIPITOR) 40 MG tablet     Sig: Take 1 tablet (40 mg) by mouth daily     Dispense:  90 tablet     Refill:  3       Medications Discontinued During This Encounter   Medication Reason     carvedilol (COREG) 6.25 MG tablet Reorder     lisinopril (PRINIVIL/ZESTRIL) 2.5 MG tablet Reorder     atorvastatin (LIPITOR) 40 MG tablet Reorder         Encounter Diagnoses   Name Primary?     Coronary artery disease involving native heart without angina pectoris, unspecified vessel or lesion type Yes     Cardiomyopathy, ischemic      Pure hypercholesterolemia      AMI anterior wall (H)      Venous (peripheral) insufficiency        CURRENT MEDICATIONS:  Current Outpatient Medications   Medication Sig Dispense Refill     ampicillin (PRINCIPEN) 500 MG capsule Take 500 mg by mouth 4 tablets prior to dental appointments       aspirin EC 81 MG EC tablet Take 1 tablet by mouth daily. 90 tablet 3     atorvastatin (LIPITOR) 40 MG tablet Take 1 tablet (40 mg) by mouth daily 90 tablet 3     carvedilol (COREG) 6.25 MG tablet Take 1 tablet (6.25 mg) by mouth 2 times daily 180 tablet 3     lisinopril (PRINIVIL/ZESTRIL) 2.5 MG tablet Take 1 tablet (2.5 mg) by mouth daily 90 tablet 3     nitroglycerin (NITROSTAT) 0.4 MG SL tablet Place 1 tablet (0.4 mg) under the  tongue every 5 minutes as needed for chest pain 25 tablet 3       ALLERGIES     Allergies   Allergen Reactions     Clindamycin Hcl Rash     Hydrocodone-Acetaminophen Itching     Warfarin Rash       PAST MEDICAL HISTORY:  Past Medical History:   Diagnosis Date     AMI anterior wall (H) 2012     CAD (coronary artery disease) 2012 - Anterior MI with Thrombectomy, PCI and JULIA to LAD 2012 - PCI and JULIA to LCx      Cardiomyopathy, ischemic      Coronary artery disease     MI 12 with thrombectomy and drug eluding STENT to LAD     Hydrocele      Hyperlipidaemia      Hyperlipidemia      Diagnosis updated by automated process. Provider to review and confirm.     Myocardial infarction (H) 2012    Anterior     S/P primary angioplasty with coronary stent 2012       PAST SURGICAL HISTORY:  Past Surgical History:   Procedure Laterality Date     HEART CATH, ANGIOPLASTY  2012    JULIA stenting to LAD     HEART CATH, ANGIOPLASTY  2012    taged cath JULIA stenting to CFX     HERNIA REPAIR       ORTHOPEDIC SURGERY      Left hip replacement       FAMILY HISTORY:  Family History   Problem Relation Age of Onset     Hypertension Father      Prostate Cancer Father      Ovarian Cancer Mother        SOCIAL HISTORY:  Social History     Socioeconomic History     Marital status:      Spouse name: None     Number of children: None     Years of education: None     Highest education level: None   Occupational History     None   Social Needs     Financial resource strain: None     Food insecurity:     Worry: None     Inability: None     Transportation needs:     Medical: None     Non-medical: None   Tobacco Use     Smoking status: Former Smoker     Last attempt to quit: 2012     Years since quittin.2     Smokeless tobacco: Former User     Tobacco comment: Vaping currently   Substance and Sexual Activity     Alcohol use: Yes     Alcohol/week: 0.0 oz     Comment: occasionally     Drug use: No      "Sexual activity: Yes     Partners: Female   Lifestyle     Physical activity:     Days per week: None     Minutes per session: None     Stress: None   Relationships     Social connections:     Talks on phone: None     Gets together: None     Attends Mandaeism service: None     Active member of club or organization: None     Attends meetings of clubs or organizations: None     Relationship status: None     Intimate partner violence:     Fear of current or ex partner: None     Emotionally abused: None     Physically abused: None     Forced sexual activity: None   Other Topics Concern     Parent/sibling w/ CABG, MI or angioplasty before 65F 55M? Not Asked      Service Not Asked     Blood Transfusions Not Asked     Caffeine Concern No     Comment: 2-3 cups per day     Occupational Exposure Not Asked     Hobby Hazards Not Asked     Sleep Concern Not Asked     Stress Concern No     Weight Concern Not Asked     Special Diet Yes     Comment: trying to eat healthier - lower carbs     Back Care Not Asked     Exercise Yes     Comment: walking-  3-4 times per week     Bike Helmet Not Asked     Seat Belt Not Asked     Self-Exams Not Asked   Social History Narrative     None       Review of Systems:  Skin:  Negative       Eyes:  Positive for glasses reading glasses  ENT:  Positive for hearing loss    Respiratory:  Negative       Cardiovascular:  Negative      Gastroenterology: Negative      Genitourinary:         Musculoskeletal:  Positive for back pain;neck pain;joint pain;arthritis    Neurologic:  Negative      Psychiatric:  Negative      Heme/Lymph/Imm:  Positive for allergies RX allergies - possible seasonal  Endocrine:  Negative        Physical Exam:  Vitals: /64 (BP Location: Right arm, Patient Position: Sitting, Cuff Size: Adult Large)   Pulse 60   Ht 1.727 m (5' 8\")   Wt 99.4 kg (219 lb 3.2 oz)   BMI 33.33 kg/m       Constitutional:  cooperative, alert and oriented, well developed, well nourished, in no " acute distress        Skin:  warm and dry to the touch, no apparent skin lesions or masses noted          Head:  normocephalic, no masses or lesions        Eyes:  pupils equal and round;sclera white;EOMS intact;conjunctivae and lids unremarkable        Lymph:      ENT:  no pallor or cyanosis        Neck:  carotid pulses are full and equal bilaterally, JVP normal, no carotid bruit        Respiratory:  normal breath sounds, clear to auscultation, normal A-P diameter, normal symmetry, normal respiratory excursion, no use of accessory muscles         Cardiac: regular rhythm, normal S1/S2, no S3 or S4, apical impulse not displaced, no murmurs, gallops or rubs                pulses full and equal                                        GI:  abdomen soft;no HSM;non-tender;no bruits obese      Extremities and Muscular Skeletal:  no deformities, clubbing, cyanosis, erythema observed stasis pigmentation bilateral LE edema;pitting;2+          Neurological:  no gross motor deficits;affect appropriate        Psych:  Alert and Oriented x 3        CC  Jarett Jose MD  [facility]  [address] [place] [address]                Thank you for allowing me to participate in the care of your patient.      Sincerely,     Grayson Kincaid MD     [facility]    cc:   Jarett Jose MD  [facility]  [address] [place] [address]

## 2019-03-01 NOTE — PROGRESS NOTES
HPI and Plan:   This 73-year-old gentleman is seen in follow-up of his coronary artery disease, ischemic cardiomyopathy, dyslipidemia, and history of anterior MI in 2012.     He states he has continued to feel well this year.  Last year he injured his shoulder and required repair.  Prior to that his surgeon requested a stress study and at [facility] he had a stress nuclear study which showed previously known small anterior infarct, no evidence of ischemia, ejection fraction actually greater than 50%.  He had uncomplicated surgery following that.  He is continued to have very slow rehab of his shoulder.    He remains off cigarettes completely. With activities, he is having no chest discomfort or other ischemic symptoms, no dyspnea and is without orthopnea, edema, palpitations, dizziness, or syncope. He denies myalgias or muscle weakness. He is without claudication or focal neurologic symptoms.   He is noting discoloration in his ankles although he is not sure he is having swelling.  He is not having pain.  He notes that he has had some recent scratches and trauma and is taken a long time to heal in his lower pretibial region but he is not having evidence of infections.     In 2012 he presented with an acute anterior myocardial infarction although somewhat late. He was taken emergently to the cath lab and had successful stenting and PCI of a culprit lesion in the mid LAD. He was left at that time with an ejection fraction around 40%-45% following that. At the time of angiography he also had a significant/severe proximal circumflex lesion.  There was only mild right coronary disease.   He subsequent had staged stenting of the proximal circumflex.  He has not had symptomatic congestive heart failure ever.  He states he is tolerating his medications well.       Exam-full exam below.  In summary:  Blood pressure 110/64 Pulse 60 and regular  -normal cardiopulmonary exam and no evidence of volume overload. Peripheral  vascular exam is normal.  -There are chronic stasis changes pretibially bilaterally two thirds of the way.  There is underlying 2+ pitting edema FDC up bilaterally.  No ulcerations or cellulitis.  Full exam detailed below.     Laboratory studies today show his electrolytes and renal function remain normal.  Lipids show LDL in the mid 80s which is mostly where he has been for the last few years.  Triglycerides are mildly elevated.  We did discuss triglyceride lowering diet and lifestyle.  He understands importance of weight loss.  He is juicing now and getting lots of fiber from the way he describes it.     Impression/plan    1-coronary artery disease. Currently without ischemic symptoms. History of anterior MI treated with drug-eluting stent in 2012. Doing appropriate risk factor intervention as above. Have recommended continued current regimen.negative stress study in 2017.    2-ischemic cardiomyopathy. Ejection fraction around 45-50 %. No heart failure, arrhythmia, or other symptoms, and no evidence of volume overload. He will continue on carvedilol and vasodilator therapy.     3-hyperlipidemia.  At reasonablegoal on atorvastatin.  Triglyceride lowering approach was recommended and discussed.    4-risk factor intervention. Exercise program details were reviewed and reiterated.  Dietary reviews suggest a good understanding and a fairly good compliance with quality of diet. We discussed the importance of quantity also. He remains abstinent cigarettes. Blood pressure is controlled.    5-peripheral venous insufficiency.  Significant skin changes now.  We spent extensive time discussing control with compression stockings and I gave him a number of resources for this.      I will have him continue his current medical regimen and have renewed his cardiovascular medications for the next year.  I will have him back in one year for follow-up.  He knows to call and come in sooner if any new issues arise.    Orders Placed  This Encounter   Procedures     Basic metabolic panel     Lipid Profile     ALT     Follow-Up with Cardiologist       Orders Placed This Encounter   Medications     carvedilol (COREG) 6.25 MG tablet     Sig: Take 1 tablet (6.25 mg) by mouth 2 times daily     Dispense:  180 tablet     Refill:  3     lisinopril (PRINIVIL/ZESTRIL) 2.5 MG tablet     Sig: Take 1 tablet (2.5 mg) by mouth daily     Dispense:  90 tablet     Refill:  3     atorvastatin (LIPITOR) 40 MG tablet     Sig: Take 1 tablet (40 mg) by mouth daily     Dispense:  90 tablet     Refill:  3       Medications Discontinued During This Encounter   Medication Reason     carvedilol (COREG) 6.25 MG tablet Reorder     lisinopril (PRINIVIL/ZESTRIL) 2.5 MG tablet Reorder     atorvastatin (LIPITOR) 40 MG tablet Reorder         Encounter Diagnoses   Name Primary?     Coronary artery disease involving native heart without angina pectoris, unspecified vessel or lesion type Yes     Cardiomyopathy, ischemic      Pure hypercholesterolemia      AMI anterior wall (H)      Venous (peripheral) insufficiency        CURRENT MEDICATIONS:  Current Outpatient Medications   Medication Sig Dispense Refill     ampicillin (PRINCIPEN) 500 MG capsule Take 500 mg by mouth 4 tablets prior to dental appointments       aspirin EC 81 MG EC tablet Take 1 tablet by mouth daily. 90 tablet 3     atorvastatin (LIPITOR) 40 MG tablet Take 1 tablet (40 mg) by mouth daily 90 tablet 3     carvedilol (COREG) 6.25 MG tablet Take 1 tablet (6.25 mg) by mouth 2 times daily 180 tablet 3     lisinopril (PRINIVIL/ZESTRIL) 2.5 MG tablet Take 1 tablet (2.5 mg) by mouth daily 90 tablet 3     nitroglycerin (NITROSTAT) 0.4 MG SL tablet Place 1 tablet (0.4 mg) under the tongue every 5 minutes as needed for chest pain 25 tablet 3       ALLERGIES     Allergies   Allergen Reactions     Clindamycin Hcl Rash     Hydrocodone-Acetaminophen Itching     Warfarin Rash       PAST MEDICAL HISTORY:  Past Medical History:    Diagnosis Date     AMI anterior wall (H) 2012     CAD (coronary artery disease) 2012 - Anterior MI with Thrombectomy, PCI and JULIA to LAD 2012 - PCI and JULIA to LCx      Cardiomyopathy, ischemic      Coronary artery disease     MI 12 with thrombectomy and drug eluding STENT to LAD     Hydrocele      Hyperlipidaemia      Hyperlipidemia      Diagnosis updated by automated process. Provider to review and confirm.     Myocardial infarction (H) 2012    Anterior     S/P primary angioplasty with coronary stent 2012       PAST SURGICAL HISTORY:  Past Surgical History:   Procedure Laterality Date     HEART CATH, ANGIOPLASTY  2012    JULIA stenting to LAD     HEART CATH, ANGIOPLASTY  2012    taged cath JULIA stenting to CFX     HERNIA REPAIR       ORTHOPEDIC SURGERY      Left hip replacement       FAMILY HISTORY:  Family History   Problem Relation Age of Onset     Hypertension Father      Prostate Cancer Father      Ovarian Cancer Mother        SOCIAL HISTORY:  Social History     Socioeconomic History     Marital status:      Spouse name: None     Number of children: None     Years of education: None     Highest education level: None   Occupational History     None   Social Needs     Financial resource strain: None     Food insecurity:     Worry: None     Inability: None     Transportation needs:     Medical: None     Non-medical: None   Tobacco Use     Smoking status: Former Smoker     Last attempt to quit: 2012     Years since quittin.2     Smokeless tobacco: Former User     Tobacco comment: Vaping currently   Substance and Sexual Activity     Alcohol use: Yes     Alcohol/week: 0.0 oz     Comment: occasionally     Drug use: No     Sexual activity: Yes     Partners: Female   Lifestyle     Physical activity:     Days per week: None     Minutes per session: None     Stress: None   Relationships     Social connections:     Talks on phone: None     Gets together: None      "Attends Roman [employer] service: None     Active member of club or organization: None     Attends meetings of clubs or organizations: None     Relationship status: None     Intimate partner violence:     Fear of current or ex partner: None     Emotionally abused: None     Physically abused: None     Forced sexual activity: None   Other Topics Concern     Parent/sibling w/ CABG, MI or angioplasty before 65F 55M? Not Asked      Service Not Asked     Blood Transfusions Not Asked     Caffeine Concern No     Comment: 2-3 cups per day     Occupational Exposure Not Asked     Hobby Hazards Not Asked     Sleep Concern Not Asked     Stress Concern No     Weight Concern Not Asked     Special Diet Yes     Comment: trying to eat healthier - lower carbs     Back Care Not Asked     Exercise Yes     Comment: walking-  3-4 times per week     Bike Helmet Not Asked     Seat Belt Not Asked     Self-Exams Not Asked   Social History Narrative     None       Review of Systems:  Skin:  Negative       Eyes:  Positive for glasses reading glasses  ENT:  Positive for hearing loss    Respiratory:  Negative       Cardiovascular:  Negative      Gastroenterology: Negative      Genitourinary:         Musculoskeletal:  Positive for back pain;neck pain;joint pain;arthritis    Neurologic:  Negative      Psychiatric:  Negative      Heme/Lymph/Imm:  Positive for allergies RX allergies - possible seasonal  Endocrine:  Negative        Physical Exam:  Vitals: /64 (BP Location: Right arm, Patient Position: Sitting, Cuff Size: Adult Large)   Pulse 60   Ht 1.727 m (5' 8\")   Wt 99.4 kg (219 lb 3.2 oz)   BMI 33.33 kg/m      Constitutional:  cooperative, alert and oriented, well developed, well nourished, in no acute distress        Skin:  warm and dry to the touch, no apparent skin lesions or masses noted          Head:  normocephalic, no masses or lesions        Eyes:  pupils equal and round;sclera white;EOMS intact;conjunctivae and lids " unremarkable        Lymph:      ENT:  no pallor or cyanosis        Neck:  carotid pulses are full and equal bilaterally, JVP normal, no carotid bruit        Respiratory:  normal breath sounds, clear to auscultation, normal A-P diameter, normal symmetry, normal respiratory excursion, no use of accessory muscles         Cardiac: regular rhythm, normal S1/S2, no S3 or S4, apical impulse not displaced, no murmurs, gallops or rubs                pulses full and equal                                        GI:  abdomen soft;no HSM;non-tender;no bruits obese      Extremities and Muscular Skeletal:  no deformities, clubbing, cyanosis, erythema observed stasis pigmentation bilateral LE edema;pitting;2+          Neurological:  no gross motor deficits;affect appropriate        Psych:  Alert and Oriented x 3        CC  Jarett Jose MD  [facility]  [address] [place] [address]

## 2019-09-29 ENCOUNTER — HEALTH MAINTENANCE LETTER (OUTPATIENT)
Age: 74
End: 2019-09-29

## 2020-03-15 ENCOUNTER — HEALTH MAINTENANCE LETTER (OUTPATIENT)
Age: 75
End: 2020-03-15

## 2020-04-02 DIAGNOSIS — I25.10 CORONARY ARTERY DISEASE INVOLVING NATIVE HEART WITHOUT ANGINA PECTORIS, UNSPECIFIED VESSEL OR LESION TYPE: ICD-10-CM

## 2020-04-02 DIAGNOSIS — I21.09 AMI ANTERIOR WALL (H): ICD-10-CM

## 2020-04-02 RX ORDER — ATORVASTATIN CALCIUM 40 MG/1
40 TABLET, FILM COATED ORAL DAILY
Qty: 90 TABLET | Refills: 0 | Status: SHIPPED | OUTPATIENT
Start: 2020-04-02 | End: 2020-04-15

## 2020-04-02 RX ORDER — CARVEDILOL 6.25 MG/1
6.25 TABLET ORAL 2 TIMES DAILY
Qty: 180 TABLET | Refills: 0 | Status: SHIPPED | OUTPATIENT
Start: 2020-04-02 | End: 2020-04-15

## 2020-04-02 RX ORDER — LISINOPRIL 2.5 MG/1
2.5 TABLET ORAL DAILY
Qty: 90 TABLET | Refills: 0 | Status: SHIPPED | OUTPATIENT
Start: 2020-04-02 | End: 2020-04-15

## 2020-04-06 DIAGNOSIS — I25.10 CORONARY ARTERY DISEASE INVOLVING NATIVE HEART WITHOUT ANGINA PECTORIS, UNSPECIFIED VESSEL OR LESION TYPE: Primary | ICD-10-CM

## 2020-04-06 DIAGNOSIS — E78.00 PURE HYPERCHOLESTEROLEMIA: ICD-10-CM

## 2020-04-06 DIAGNOSIS — I25.5 CARDIOMYOPATHY, ISCHEMIC: ICD-10-CM

## 2020-04-06 NOTE — PROGRESS NOTES
Pt scheduled for labs and to see Dr. Kincaid 4/15/20. Per Dr. Kincaid ok for telephone visit, needs labs prior.   Called pt, agrees w/ telephone visit. Pt will have labs done at [facility] lab. Provided number to their clinic to schedule.   Pt aware to check BP, HR and wt prior to visit.   Amanda RN, BSN  04/06/20 10:50 AM

## 2020-04-09 DIAGNOSIS — I25.10 CORONARY ARTERY DISEASE INVOLVING NATIVE HEART WITHOUT ANGINA PECTORIS, UNSPECIFIED VESSEL OR LESION TYPE: ICD-10-CM

## 2020-04-09 DIAGNOSIS — I25.5 CARDIOMYOPATHY, ISCHEMIC: ICD-10-CM

## 2020-04-09 DIAGNOSIS — E78.00 PURE HYPERCHOLESTEROLEMIA: ICD-10-CM

## 2020-04-09 PROCEDURE — 80048 BASIC METABOLIC PNL TOTAL CA: CPT | Performed by: INTERNAL MEDICINE

## 2020-04-09 PROCEDURE — 80061 LIPID PANEL: CPT | Performed by: INTERNAL MEDICINE

## 2020-04-09 PROCEDURE — 84460 ALANINE AMINO (ALT) (SGPT): CPT | Performed by: INTERNAL MEDICINE

## 2020-04-09 PROCEDURE — 36415 COLL VENOUS BLD VENIPUNCTURE: CPT | Performed by: INTERNAL MEDICINE

## 2020-04-10 LAB
ALT SERPL W P-5'-P-CCNC: 22 U/L (ref 0–70)
ANION GAP SERPL CALCULATED.3IONS-SCNC: 5 MMOL/L (ref 3–14)
BUN SERPL-MCNC: 10 MG/DL (ref 7–30)
CALCIUM SERPL-MCNC: 8.7 MG/DL (ref 8.5–10.1)
CHLORIDE SERPL-SCNC: 105 MMOL/L (ref 94–109)
CHOLEST SERPL-MCNC: 201 MG/DL
CO2 SERPL-SCNC: 26 MMOL/L (ref 20–32)
CREAT SERPL-MCNC: 0.77 MG/DL (ref 0.66–1.25)
GFR SERPL CREATININE-BSD FRML MDRD: 88 ML/MIN/{1.73_M2}
GLUCOSE SERPL-MCNC: 93 MG/DL (ref 70–99)
HDLC SERPL-MCNC: 44 MG/DL
LDLC SERPL CALC-MCNC: 112 MG/DL
NONHDLC SERPL-MCNC: 157 MG/DL
POTASSIUM SERPL-SCNC: 4.3 MMOL/L (ref 3.4–5.3)
SODIUM SERPL-SCNC: 136 MMOL/L (ref 133–144)
TRIGL SERPL-MCNC: 224 MG/DL

## 2020-04-15 ENCOUNTER — VIRTUAL VISIT (OUTPATIENT)
Dept: CARDIOLOGY | Facility: CLINIC | Age: 75
End: 2020-04-15
Payer: COMMERCIAL

## 2020-04-15 VITALS
HEART RATE: 58 BPM | DIASTOLIC BLOOD PRESSURE: 81 MMHG | WEIGHT: 216 LBS | SYSTOLIC BLOOD PRESSURE: 145 MMHG | BODY MASS INDEX: 32.84 KG/M2

## 2020-04-15 DIAGNOSIS — E78.00 PURE HYPERCHOLESTEROLEMIA: ICD-10-CM

## 2020-04-15 DIAGNOSIS — I25.5 CARDIOMYOPATHY, ISCHEMIC: ICD-10-CM

## 2020-04-15 DIAGNOSIS — I21.09 AMI ANTERIOR WALL (H): ICD-10-CM

## 2020-04-15 DIAGNOSIS — I25.10 CORONARY ARTERY DISEASE INVOLVING NATIVE HEART WITHOUT ANGINA PECTORIS, UNSPECIFIED VESSEL OR LESION TYPE: ICD-10-CM

## 2020-04-15 DIAGNOSIS — I25.10 CORONARY ARTERY DISEASE INVOLVING NATIVE CORONARY ARTERY OF NATIVE HEART WITHOUT ANGINA PECTORIS: Primary | ICD-10-CM

## 2020-04-15 DIAGNOSIS — Z95.5 S/P PRIMARY ANGIOPLASTY WITH CORONARY STENT: ICD-10-CM

## 2020-04-15 DIAGNOSIS — I10 ESSENTIAL HYPERTENSION: ICD-10-CM

## 2020-04-15 PROCEDURE — 99213 OFFICE O/P EST LOW 20 MIN: CPT | Mod: 95 | Performed by: INTERNAL MEDICINE

## 2020-04-15 RX ORDER — TAMSULOSIN HYDROCHLORIDE 0.4 MG/1
0.4 CAPSULE ORAL DAILY
COMMUNITY

## 2020-04-15 RX ORDER — ATORVASTATIN CALCIUM 40 MG/1
40 TABLET, FILM COATED ORAL DAILY
Qty: 90 TABLET | Refills: 3 | Status: SHIPPED | OUTPATIENT
Start: 2020-04-15 | End: 2021-04-08

## 2020-04-15 RX ORDER — LISINOPRIL 2.5 MG/1
2.5 TABLET ORAL DAILY
Qty: 90 TABLET | Refills: 3 | Status: SHIPPED | OUTPATIENT
Start: 2020-04-15 | End: 2021-04-08

## 2020-04-15 RX ORDER — CARVEDILOL 6.25 MG/1
6.25 TABLET ORAL 2 TIMES DAILY
Qty: 180 TABLET | Refills: 3 | Status: SHIPPED | OUTPATIENT
Start: 2020-04-15 | End: 2021-04-08

## 2020-04-15 NOTE — PROGRESS NOTES
"Lion Chaudhari is a 74 year old male who is being evaluated via a billable telephone visit.      The patient has been notified of following:     \"This telephone visit will be conducted via a call between you and your physician/provider. We have found that certain health care needs can be provided without the need for a physical exam.  This service lets us provide the care you need with a short phone conversation.  If a prescription is necessary we can send it directly to your pharmacy.  If lab work is needed we can place an order for that and you can then stop by our lab to have the test done at a later time.    Telephone visits are billed at different rates depending on your insurance coverage. During this emergency period, for some insurers they may be billed the same as an in-person visit.  Please reach out to your insurance provider with any questions.    If during the course of the call the physician/provider feels a telephone visit is not appropriate, you will not be charged for this service.\"    Patient has given verbal consent for Telephone visit?  Yes    How would you like to obtain your AVS? Mail a copy     Review Of Systems  Skin: Negative  Eyes: Negative  Ears/Nose/Throat: Negative  Respiratory: Negative  Cardiovascular: Negative  Gastrointestinal: Negative  Genitourinary: Negative  Musculoskeletal: Positive for joint pain, hx hip replacement  Neurologic: Negative  Psychiatric: Negative  Hematologic/Lymphatic/Immunologic: Negative  Endocrine: Negative    Patient reported vitals:  BP: 145/81  Heart rate: 58  Weight: 216 lbs    Julia Schumacher CMA    Additional provider notes:    HPI and Plan:   This 74-year-old gentleman is seen via tele-visit (telephone, due to pandemic) for follow-up of his coronary artery disease, ischemic cardiomyopathy, dyslipidemia, and history of anterior MI in 2012 treated with LAD stenting.      He states he has continued to feel well this year.   He just got back from over 6 " weeks in [place] caring for his grandchildren.  Of note it appears he had quite a bit different diet there with lots more meat and fried food.  However he has not really gained weight.  He tolerated all that without any problems he states.   He remains off cigarettes completely. With activities, he is having no chest discomfort or other ischemic symptoms, no dyspnea and is without orthopnea, edema, palpitations, dizziness, or syncope. He denies myalgias or muscle weakness. He is without claudication or focal neurologic symptoms.     In 2018 he injured his shoulder and required repair.  Prior to that his surgeon requested a stress study and at [facility] he had a stress nuclear study which showed previously known small anterior infarct, no evidence of ischemia, ejection fraction actually greater than 50%.  He had uncomplicated surgery following that.  He is continued to have very slow rehab of his shoulder.      In 2012 he presented with an acute anterior myocardial infarction although somewhat late. He was taken emergently to the cath lab and had successful stenting and PCI of a culprit lesion in the mid LAD. He was left at that time with an ejection fraction around 40%-45% following that. At the time of angiography he also had a significant/severe proximal circumflex lesion.  There was only mild right coronary disease.   He subsequent had staged stenting of the proximal circumflex.  He has not had symptomatic congestive heart failure ever.  He states he is tolerating his medications well.        Blood pressure (!) 145/81, pulse 58, weight 98 kg (216 lb).  General:  no apparent distress, normal body habitus, sitting upright.    Chest/Lungs:  No breathing difficulty while speaking.  No audible wheezing.  No cough during conversation.  .  Neurologic: Alert and oriented x3.  Good memory.  Fluent speech.  Normal cognitive function  Psychiatric:  Alert and oriented x3, calm demeanor, no pressured speech or  tardive speech     Laboratory studies from this month reviewed with him.  Electrolytes and renal function are normal.  However cholesterol and triglycerides have increased significantly from the last few years.  LDL is now 112, up from around 85-90 previously on his current regimen.  He states he is taking his statin every day.  Only difference seems to be perhaps the lifestyle he was having in [place].     Impression/plan     1-coronary artery disease. Currently without ischemic symptoms. History of anterior MI treated with drug-eluting stent in 2012. Doing appropriate risk factor intervention as above. Have recommended continued current regimen.negative stress study in 2017.  Also status post circumflex stenting.     2-ischemic cardiomyopathy. Ejection fraction around 45-50 %. No heart failure, arrhythmia, or other symptoms, and no evidence of volume overload. He will continue on carvedilol and vasodilator therapy.      3-hyperlipidemia.  Previously at goal on his current dose.  We reviewed optimal diet and I would like to see how he does with this now that he is back in [place] to his previous diet.  I will check a lipid profile in 3 months.  If he is back to his previous goal LDL of less than 90 we will continue current regimen, however if LDL remains significant elevated above that will need to go a higher dose of atorvastatin or change to rosuvastatin     4-risk factor intervention..  Dietary recommendations reviewed in detail today.   He remains abstinent cigarettes. Blood pressure appears suboptimal.  On statin therapy, but as above not at goal at this time     5-hypertension.  He states a number of blood pressures recently have been in the 140s systolic.  Over the past few years in clinic here he is generally been around 110 systolic or less.  I am going to have him take his blood pressure several times a week and discussed how to do this.  He will write the results down and let my nurses know in  about 6 weeks and we will decide whether we need to add to his pharmacologic treatment or whether we continue his current regimen based on those results.      I will have him continue his current medical regimen and have renewed his cardiovascular medications for the next year.  I will have him back in one year for follow-up.  We will get blood pressure results follow-up from him in about 6 weeks.  He knows to call and come in sooner if any new issues arise.    Orders Placed This Encounter   Procedures     Lipid Profile     Lipid Profile     Basic metabolic panel     Follow-Up with Cardiac Advanced Practice Provider       Orders Placed This Encounter   Medications     tamsulosin (FLOMAX) 0.4 MG capsule     Sig: Take 0.4 mg by mouth daily     carvedilol (COREG) 6.25 MG tablet     Sig: Take 1 tablet (6.25 mg) by mouth 2 times daily     Dispense:  180 tablet     Refill:  3     lisinopril (ZESTRIL) 2.5 MG tablet     Sig: Take 1 tablet (2.5 mg) by mouth daily     Dispense:  90 tablet     Refill:  3     atorvastatin (LIPITOR) 40 MG tablet     Sig: Take 1 tablet (40 mg) by mouth daily     Dispense:  90 tablet     Refill:  3       Medications Discontinued During This Encounter   Medication Reason     carvedilol (COREG) 6.25 MG tablet Reorder     lisinopril (ZESTRIL) 2.5 MG tablet Reorder     atorvastatin (LIPITOR) 40 MG tablet Reorder         Encounter Diagnoses   Name Primary?     Coronary artery disease involving native coronary artery of native heart without angina pectoris Yes     Pure hypercholesterolemia      Cardiomyopathy, ischemic      S/P primary angioplasty with coronary stent      Essential hypertension      AMI anterior wall (H)      Coronary artery disease involving native heart without angina pectoris, unspecified vessel or lesion type        CURRENT MEDICATIONS:  Current Outpatient Medications   Medication Sig Dispense Refill     ampicillin (PRINCIPEN) 500 MG capsule Take 500 mg by mouth 4 tablets prior to  dental appointments       aspirin EC 81 MG EC tablet Take 1 tablet by mouth daily. 90 tablet 3     atorvastatin (LIPITOR) 40 MG tablet Take 1 tablet (40 mg) by mouth daily 90 tablet 3     carvedilol (COREG) 6.25 MG tablet Take 1 tablet (6.25 mg) by mouth 2 times daily 180 tablet 3     lisinopril (ZESTRIL) 2.5 MG tablet Take 1 tablet (2.5 mg) by mouth daily 90 tablet 3     nitroglycerin (NITROSTAT) 0.4 MG SL tablet Place 1 tablet (0.4 mg) under the tongue every 5 minutes as needed for chest pain 25 tablet 3     tamsulosin (FLOMAX) 0.4 MG capsule Take 0.4 mg by mouth daily         ALLERGIES     Allergies   Allergen Reactions     Clindamycin Hcl Rash     Hydrocodone-Acetaminophen Itching     Warfarin Rash       PAST MEDICAL HISTORY:  Past Medical History:   Diagnosis Date     AMI anterior wall (H) 6/12/2012     CAD (coronary artery disease) 6/12/2012 6/2012 - Anterior MI with Thrombectomy, PCI and JULIA to LAD 7/2012 - PCI and JULIA to LCx      Cardiomyopathy, ischemic      Coronary artery disease     MI 6/12/12 with thrombectomy and drug eluding STENT to LAD     Hydrocele      Hyperlipidaemia      Hyperlipidemia      Diagnosis updated by automated process. Provider to review and confirm.     Myocardial infarction (H) 6/2012    Anterior     S/P primary angioplasty with coronary stent 6/12/2012       PAST SURGICAL HISTORY:  Past Surgical History:   Procedure Laterality Date     HEART CATH, ANGIOPLASTY  6/2012    JULIA stenting to LAD     HEART CATH, ANGIOPLASTY  7/2012    taged cath JULIA stenting to CFX     HERNIA REPAIR       ORTHOPEDIC SURGERY      Left hip replacement       FAMILY HISTORY:  Family History   Problem Relation Age of Onset     Hypertension Father      Prostate Cancer Father      Ovarian Cancer Mother        SOCIAL HISTORY:  Social History     Socioeconomic History     Marital status:      Spouse name: None     Number of children: None     Years of education: None     Highest education level: None    Occupational History     None   Social Needs     Financial resource strain: None     Food insecurity     Worry: None     Inability: None     Transportation needs     Medical: None     Non-medical: None   Tobacco Use     Smoking status: Former Smoker     Last attempt to quit: 2012     Years since quittin.3     Smokeless tobacco: Former User     Tobacco comment: Vaping currently   Substance and Sexual Activity     Alcohol use: Yes     Alcohol/week: 0.0 standard drinks     Comment: occasionally     Drug use: No     Sexual activity: Yes     Partners: Female   Lifestyle     Physical activity     Days per week: None     Minutes per session: None     Stress: None   Relationships     Social connections     Talks on phone: None     Gets together: None     Attends Rastafari service: None     Active member of club or organization: None     Attends meetings of clubs or organizations: None     Relationship status: None     Intimate partner violence     Fear of current or ex partner: None     Emotionally abused: None     Physically abused: None     Forced sexual activity: None   Other Topics Concern     Parent/sibling w/ CABG, MI or angioplasty before 65F 55M? Not Asked      Service Not Asked     Blood Transfusions Not Asked     Caffeine Concern No     Comment: 2-3 cups per day     Occupational Exposure Not Asked     Hobby Hazards Not Asked     Sleep Concern Not Asked     Stress Concern No     Weight Concern Not Asked     Special Diet Yes     Comment: trying to eat healthier - lower carbs     Back Care Not Asked     Exercise Yes     Comment: walking-  3-4 times per week     Bike Helmet Not Asked     Seat Belt Not Asked     Self-Exams Not Asked   Social History Narrative     None         CC  Jarett Jose MD  [facility]  [address] [place] [address]      Phone call duration: 12 minutes minutes, also greater than 10 minutes review of his previous imaging studies labs and review his  outside records offline in addition to the phone visit.  Plus time updating chart and doing orders and renewing prescriptions.    Grayson Kincaid MD

## 2020-04-15 NOTE — PATIENT INSTRUCTIONS
Use your blood pressure cuff to take your blood pressure 2 or 3 times a week.  Rest quietly for 5 to 10 minutes before taking it.  In 5 or 6 weeks call my nurse and report those results.  Our goal is to have a large majority of your blood pressures, if not all, less than 130 systolic.

## 2020-07-17 ENCOUNTER — TELEPHONE (OUTPATIENT)
Dept: CARDIOLOGY | Facility: CLINIC | Age: 75
End: 2020-07-17

## 2020-07-17 NOTE — TELEPHONE ENCOUNTER

## 2020-07-20 ENCOUNTER — TELEPHONE (OUTPATIENT)
Dept: CARDIOLOGY | Facility: CLINIC | Age: 75
End: 2020-07-20

## 2020-07-20 DIAGNOSIS — E78.00 PURE HYPERCHOLESTEROLEMIA: ICD-10-CM

## 2020-07-20 LAB
CHOLEST SERPL-MCNC: 164 MG/DL
HDLC SERPL-MCNC: 40 MG/DL
LDLC SERPL CALC-MCNC: 92 MG/DL
NONHDLC SERPL-MCNC: 124 MG/DL
TRIGL SERPL-MCNC: 158 MG/DL

## 2020-07-20 PROCEDURE — 36415 COLL VENOUS BLD VENIPUNCTURE: CPT | Performed by: INTERNAL MEDICINE

## 2020-07-20 PROCEDURE — 80061 LIPID PANEL: CPT | Performed by: INTERNAL MEDICINE

## 2020-07-20 NOTE — TELEPHONE ENCOUNTER
Pt had a fasting lipid panel done today, see results below. Called and spoke with pt, reviewed results. Pt stated he is back to his usual diet and activity level since he had his lipids last checked in April. Pt confirmed he is taking atorvastatin 40 mg daily as prescribed. At last visit Dr. Kincaid also stated he wanted pt to check his BP at home, pt reported he does check it but he did not have his readings in front of him to review. Pt recalled his BP this morning was 124/76 and said this is typical.  Pt stated his systolic BP has been <130. Advised pt will update Dr. Kincaid and let pt know if any further recommendations.       Component      Latest Ref Rng & Units 4/9/2020 7/20/2020   Cholesterol      <200 mg/dL 201 (H) 164   Triglycerides      <150 mg/dL 224 (H) 158 (H)   HDL Cholesterol      >39 mg/dL 44 40   LDL Cholesterol Calculated      <100 mg/dL 112 (H) 92   Non HDL Cholesterol      <130 mg/dL 157 (H) 124

## 2020-07-28 NOTE — TELEPHONE ENCOUNTER
Spoke with pt about Dr. Kincaid's recommendations. Informed pt that he will follow up with labs next April 2021 but he can always call if he has any issues prior to that time. Pt gave verbal understanding.

## 2020-07-28 NOTE — TELEPHONE ENCOUNTER
Grayson Kincaid MD Hair, Ashley W RN    Caller: Unspecified (1 week ago)               LDL goal is been less than 90, he is close to that.  Please have him continue better lifestyle so he can get it back in around into the low 80s where it was at one time.  Blood pressure appears to be okay.

## 2021-01-15 ENCOUNTER — HEALTH MAINTENANCE LETTER (OUTPATIENT)
Age: 76
End: 2021-01-15

## 2021-04-08 ENCOUNTER — OFFICE VISIT (OUTPATIENT)
Dept: CARDIOLOGY | Facility: CLINIC | Age: 76
End: 2021-04-08
Payer: COMMERCIAL

## 2021-04-08 VITALS
DIASTOLIC BLOOD PRESSURE: 62 MMHG | SYSTOLIC BLOOD PRESSURE: 108 MMHG | HEIGHT: 68 IN | OXYGEN SATURATION: 94 % | HEART RATE: 51 BPM | WEIGHT: 228 LBS | BODY MASS INDEX: 34.56 KG/M2

## 2021-04-08 DIAGNOSIS — I25.10 CORONARY ARTERY DISEASE INVOLVING NATIVE CORONARY ARTERY OF NATIVE HEART WITHOUT ANGINA PECTORIS: ICD-10-CM

## 2021-04-08 DIAGNOSIS — E78.00 PURE HYPERCHOLESTEROLEMIA: ICD-10-CM

## 2021-04-08 DIAGNOSIS — I25.5 CARDIOMYOPATHY, ISCHEMIC: ICD-10-CM

## 2021-04-08 DIAGNOSIS — I10 ESSENTIAL HYPERTENSION: ICD-10-CM

## 2021-04-08 DIAGNOSIS — I21.09 AMI ANTERIOR WALL (H): ICD-10-CM

## 2021-04-08 DIAGNOSIS — I25.10 CORONARY ARTERY DISEASE INVOLVING NATIVE HEART WITHOUT ANGINA PECTORIS, UNSPECIFIED VESSEL OR LESION TYPE: ICD-10-CM

## 2021-04-08 LAB
ANION GAP SERPL CALCULATED.3IONS-SCNC: 2 MMOL/L (ref 3–14)
BUN SERPL-MCNC: 7 MG/DL (ref 7–30)
CALCIUM SERPL-MCNC: 8.6 MG/DL (ref 8.5–10.1)
CHLORIDE SERPL-SCNC: 107 MMOL/L (ref 94–109)
CHOLEST SERPL-MCNC: 124 MG/DL
CO2 SERPL-SCNC: 29 MMOL/L (ref 20–32)
CREAT SERPL-MCNC: 0.88 MG/DL (ref 0.66–1.25)
GFR SERPL CREATININE-BSD FRML MDRD: 83 ML/MIN/{1.73_M2}
GLUCOSE SERPL-MCNC: 103 MG/DL (ref 70–99)
HDLC SERPL-MCNC: 39 MG/DL
LDLC SERPL CALC-MCNC: 55 MG/DL
NONHDLC SERPL-MCNC: 85 MG/DL
POTASSIUM SERPL-SCNC: 4.2 MMOL/L (ref 3.4–5.3)
SODIUM SERPL-SCNC: 138 MMOL/L (ref 133–144)
TRIGL SERPL-MCNC: 149 MG/DL

## 2021-04-08 PROCEDURE — 99214 OFFICE O/P EST MOD 30 MIN: CPT | Performed by: PHYSICIAN ASSISTANT

## 2021-04-08 PROCEDURE — 36415 COLL VENOUS BLD VENIPUNCTURE: CPT | Performed by: INTERNAL MEDICINE

## 2021-04-08 PROCEDURE — 80048 BASIC METABOLIC PNL TOTAL CA: CPT | Performed by: INTERNAL MEDICINE

## 2021-04-08 PROCEDURE — 80061 LIPID PANEL: CPT | Performed by: INTERNAL MEDICINE

## 2021-04-08 RX ORDER — LISINOPRIL 2.5 MG/1
2.5 TABLET ORAL DAILY
Qty: 90 TABLET | Refills: 3 | Status: SHIPPED | OUTPATIENT
Start: 2021-04-08 | End: 2022-07-01

## 2021-04-08 RX ORDER — ATORVASTATIN CALCIUM 40 MG/1
40 TABLET, FILM COATED ORAL DAILY
Qty: 90 TABLET | Refills: 3 | Status: SHIPPED | OUTPATIENT
Start: 2021-04-08 | End: 2022-07-01

## 2021-04-08 RX ORDER — CARVEDILOL 6.25 MG/1
6.25 TABLET ORAL 2 TIMES DAILY
Qty: 180 TABLET | Refills: 3 | Status: SHIPPED | OUTPATIENT
Start: 2021-04-08 | End: 2022-07-01

## 2021-04-08 ASSESSMENT — MIFFLIN-ST. JEOR: SCORE: 1733.57

## 2021-04-08 NOTE — LETTER
2021    Jarett Jose MD  [facility] [address]   [place] [address]    RE: Lion Chaudhari       Dear Colleague,    I had the pleasure of seeing Lion Chaudhari in the [facility].    CARDIOLOGY CLINIC PROGRESS NOTE    DOS: 2021      Lion Chaudhari  : 1945, 75 year old  MRN: [id number]      History:  I am meeting Lion Chaudhari today for follow up.  He was a patient of Dr. Kincaid.     Lion Chaudhari is a 75 year old man with history of coronary artery disease, ischemic cardiomyopathy, dyslipidemia, and history of anterior MI in  treated with LAD stenting, and staged circ stenting, history of smoking.        In  he presented with an acute anterior myocardial infarction although somewhat late. He was taken emergently to the cath lab and had successful stenting and PCI of a culprit lesion in the mid LAD. He was left at that time with an ejection fraction around 40%-45% following that. At the time of angiography he also had a significant/severe proximal circumflex lesion.  There was only mild right coronary disease.  He subsequently had staged stenting of the proximal circumflex.  He has not had symptomatic congestive heart failure ever.     In 2018 he injured his shoulder and required repair.  Prior to that his surgeon requested a stress study and at [facility] he had a stress nuclear study which showed previously known small anterior infarct, no evidence of ischemia, ejection fraction actually greater than 50%.  He had uncomplicated surgery following that.  He is continued to have very slow rehab of his shoulder.      Interval History:   Last seen 4/15/20 by Dr. Kincaid.  LDL was higher than typical at 112.  BPs were running 140s.       20 FLP on atorvastatin 40 mg daily with LDL back to 92.  SBPs were back to <130.       He presents today for follow up.   FLP and BMP were drawn today 2021.    Component      Latest Ref Rng & Units 4/8/2021   Sodium      133 - 144 mmol/L 138   Potassium      3.4 - 5.3 mmol/L 4.2   Chloride      94 - 109 mmol/L 107   Carbon Dioxide      20 - 32 mmol/L 29   Anion Gap      3 - 14 mmol/L 2 (L)   Glucose      70 - 99 mg/dL 103 (H)   Urea Nitrogen      7 - 30 mg/dL 7   Creatinine      0.66 - 1.25 mg/dL 0.88   GFR Estimate      >60 mL/min/1.73:m2 83   GFR Estimate If Black      >60 mL/min/1.73:m2 >90   Calcium      8.5 - 10.1 mg/dL 8.6   Cholesterol      <200 mg/dL 124   Triglycerides      <150 mg/dL 149   HDL Cholesterol      >39 mg/dL 39 (L)   LDL Cholesterol Calculated      <100 mg/dL 55   Non HDL Cholesterol      <130 mg/dL 85   LDL is great, down to 55.   BMP is unremarkable except fasting glucose is just minimally elevated at 103.   BP is well controlled today at 108/62.   HR is a little slower today.  No sxs of LH, dizziness, near syncope, or syncope.   He has gained some weight over the past COVID year.   With activities, he is having no chest discomfort or other ischemic symptoms, no dyspnea and is without orthopnea, edema, palpitations, dizziness, or syncope. He denies myalgias or muscle weakness. He is without claudication or focal neurologic symptoms.   No bleeding concerns.   He states he is tolerating his medications well.      ROS:  Skin:  Negative     Eyes:  Positive for glasses  ENT:  Positive for hearing loss  Respiratory:  Negative    Cardiovascular:  Negative    Gastroenterology: Negative    Genitourinary:  Positive for urinary frequency;hesitancy  Musculoskeletal:  Positive for back pain;neck pain;joint pain;arthritis  Neurologic:  Negative    Psychiatric:  Negative    Heme/Lymph/Imm:  Positive for allergies  Endocrine:  Negative      PAST MEDICAL HISTORY:  Past Medical History:   Diagnosis Date     AMI anterior wall (H) 6/12/2012     CAD (coronary artery disease) 6/12/2012 6/2012 - Anterior MI with Thrombectomy, PCI and JULIA to LAD 7/2012 - PCI and JULIA  to LCx      Cardiomyopathy, ischemic      Coronary artery disease     MI 12 with thrombectomy and drug eluding STENT to LAD     Hydrocele      Hyperlipidaemia      Hyperlipidemia      Diagnosis updated by automated process. Provider to review and confirm.     Myocardial infarction (H) 2012    Anterior     S/P primary angioplasty with coronary stent 2012       PAST SURGICAL HISTORY:  Past Surgical History:   Procedure Laterality Date     HEART CATH, ANGIOPLASTY  2012    JULIA stenting to LAD     HEART CATH, ANGIOPLASTY  2012    taged cath JULIA stenting to CFX     HERNIA REPAIR       ORTHOPEDIC SURGERY      Left hip replacement       SOCIAL HISTORY:  Social History     Socioeconomic History     Marital status:      Spouse name: Not on file     Number of children: Not on file     Years of education: Not on file     Highest education level: Not on file   Occupational History     Not on file   Social Needs     Financial resource strain: Not on file     Food insecurity     Worry: Not on file     Inability: Not on file     Transportation needs     Medical: Not on file     Non-medical: Not on file   Tobacco Use     Smoking status: Former Smoker     Quit date: 2012     Years since quittin.3     Smokeless tobacco: Former User     Tobacco comment: Vaping currently   Substance and Sexual Activity     Alcohol use: Yes     Alcohol/week: 0.0 standard drinks     Comment: occasionally     Drug use: No     Sexual activity: Yes     Partners: Female   Lifestyle     Physical activity     Days per week: Not on file     Minutes per session: Not on file     Stress: Not on file   Relationships     Social connections     Talks on phone: Not on file     Gets together: Not on file     Attends Synagogue service: Not on file     Active member of club or organization: Not on file     Attends meetings of clubs or organizations: Not on file     Relationship status: Not on file     Intimate partner violence     Fear of  "current or ex partner: Not on file     Emotionally abused: Not on file     Physically abused: Not on file     Forced sexual activity: Not on file   Other Topics Concern     Parent/sibling w/ CABG, MI or angioplasty before 65F 55M? Not Asked      Service Not Asked     Blood Transfusions Not Asked     Caffeine Concern No     Comment: 2-3 cups per day     Occupational Exposure Not Asked     Hobby Hazards Not Asked     Sleep Concern Not Asked     Stress Concern No     Weight Concern Not Asked     Special Diet Yes     Comment: trying to eat healthier - lower carbs     Back Care Not Asked     Exercise Yes     Comment: walking-  3-4 times per week     Bike Helmet Not Asked     Seat Belt Not Asked     Self-Exams Not Asked   Social History Narrative     Not on file       FAMILY HISTORY:  Family History   Problem Relation Age of Onset     Hypertension Father      Prostate Cancer Father      Ovarian Cancer Mother        MEDS: aspirin EC 81 MG EC tablet, Take 1 tablet by mouth daily.  tamsulosin (FLOMAX) 0.4 MG capsule, Take 0.4 mg by mouth daily  ampicillin (PRINCIPEN) 500 MG capsule, Take 500 mg by mouth 4 tablets prior to dental appointments  nitroglycerin (NITROSTAT) 0.4 MG SL tablet, Place 1 tablet (0.4 mg) under the tongue every 5 minutes as needed for chest pain (Patient not taking: Reported on 4/8/2021)    No current facility-administered medications on file prior to visit.       ALLERGIES:   Allergies   Allergen Reactions     Clindamycin Hcl Rash     Hydrocodone-Acetaminophen Itching     Warfarin Rash       PHYSICAL EXAM:  Vitals: /62 (BP Location: Right arm, Patient Position: Sitting, Cuff Size: Adult Regular)   Pulse 51   Ht 1.727 m (5' 7.99\")   Wt 103.4 kg (228 lb)   SpO2 94%   BMI 34.68 kg/m    Constitutional:  cooperative, alert and oriented, well developed, well nourished, in no acute distress obese      Skin:  warm and dry to the touch, no apparent skin lesions or masses noted        Head:  " normocephalic, no masses or lesions        Eyes:  pupils equal and round;conjunctivae and lids unremarkable;sclera white        ENT:  no pallor or cyanosis        Neck:  JVP normal;no carotid bruit        Respiratory:  normal breath sounds, clear to auscultation, normal A-P diameter, normal symmetry, normal respiratory excursion, no use of accessory muscles        Cardiac: regular rhythm, normal S1/S2, no S3 or S4, apical impulse not displaced, no murmurs, gallops or rubs                  GI:  abdomen soft;BS normoactive obese      Vascular: pulses full and equal                                      Extremities and Musculoskeletal:  no deformities, clubbing, cyanosis, erythema observed;no edema stasis pigmentation      Neurological:  no gross motor deficits;affect appropriate            LABS/DATA:  I reviewed the following:  Component      Latest Ref Rng & Units 4/8/2021   Sodium      133 - 144 mmol/L 138   Potassium      3.4 - 5.3 mmol/L 4.2   Chloride      94 - 109 mmol/L 107   Carbon Dioxide      20 - 32 mmol/L 29   Anion Gap      3 - 14 mmol/L 2 (L)   Glucose      70 - 99 mg/dL 103 (H)   Urea Nitrogen      7 - 30 mg/dL 7   Creatinine      0.66 - 1.25 mg/dL 0.88   GFR Estimate      >60 mL/min/1.73:m2 83   GFR Estimate If Black      >60 mL/min/1.73:m2 >90   Calcium      8.5 - 10.1 mg/dL 8.6   Cholesterol      <200 mg/dL 124   Triglycerides      <150 mg/dL 149   HDL Cholesterol      >39 mg/dL 39 (L)   LDL Cholesterol Calculated      <100 mg/dL 55   Non HDL Cholesterol      <130 mg/dL 85         ASSESSMENT/PLAN:  1-coronary artery disease.  History of anterior MI treated with drug-eluting stent in 2012.  Also staged stenting of the circ.    Negative stress study in 2017.   Currently without ischemic symptoms.   Doing appropriate risk factor intervention. Have recommended continued current regimen.      2-ischemic cardiomyopathy. Ejection fraction around 45-50%.   No heart failure, arrhythmia, or other symptoms, and  no evidence of volume overload. He will continue on carvedilol and lisinopril.     3-hyperlipidemia.  FLP today shows LDL at goal (55) on his current dose of atorvastatin 40 mg.  HDL is a little low at 39, TC good at 124 and TG just WNL at 149.       4-hypertension.  BP controlled on lisinopril 2.5 mg, Coreg 6.25 mg BID.    BMP today reviewed and looks good.     5-risk factor intervention..  Dietary recommendations reviewed in detail today.   He remains abstinent cigarettes.  He does vape, and we discussed that it would be best if he stopped this.  Blood pressure controlled.  On statin therapy and LDL at goal.  Glucose is just minimally elevated at 103.  We discussed dietary changes and exercise, weight loss.         Meds refilled for the year.       Follow up:  1 year with new cardiologist, JESSICA, SUSANA, BMP      Nathan Rasmussen PA-C    cc:   Grayson Kincaid MD  [address]  [place] [address]

## 2021-04-08 NOTE — PROGRESS NOTES
CARDIOLOGY CLINIC PROGRESS NOTE    DOS: 2021      Lion Chaudhari  : 1945, 75 year old  MRN: [id number]      History:  I am meeting Lion Chaudhari today for follow up.  He was a patient of Dr. Kincaid.     Lion Chaudhari is a 75 year old man with history of coronary artery disease, ischemic cardiomyopathy, dyslipidemia, and history of anterior MI in  treated with LAD stenting, and staged circ stenting, history of smoking.        In  he presented with an acute anterior myocardial infarction although somewhat late. He was taken emergently to the cath lab and had successful stenting and PCI of a culprit lesion in the mid LAD. He was left at that time with an ejection fraction around 40%-45% following that. At the time of angiography he also had a significant/severe proximal circumflex lesion.  There was only mild right coronary disease.  He subsequently had staged stenting of the proximal circumflex.  He has not had symptomatic congestive heart failure ever.     In 2018 he injured his shoulder and required repair.  Prior to that his surgeon requested a stress study and at [facility] he had a stress nuclear study which showed previously known small anterior infarct, no evidence of ischemia, ejection fraction actually greater than 50%.  He had uncomplicated surgery following that.  He is continued to have very slow rehab of his shoulder.      Interval History:   Last seen 4/15/20 by Dr. Kincaid.  LDL was higher than typical at 112.  BPs were running 140s.       20 FLP on atorvastatin 40 mg daily with LDL back to 92.  SBPs were back to <130.       He presents today for follow up.   FLP and BMP were drawn today 2021.   Component      Latest Ref Rng & Units 2021   Sodium      133 - 144 mmol/L 138   Potassium      3.4 - 5.3 mmol/L 4.2   Chloride      94 - 109 mmol/L 107   Carbon Dioxide      20 - 32 mmol/L 29   Anion Gap      3 - 14 mmol/L 2 (L)   Glucose      70 - 99 mg/dL 103  (H)   Urea Nitrogen      7 - 30 mg/dL 7   Creatinine      0.66 - 1.25 mg/dL 0.88   GFR Estimate      >60 mL/min/1.73:m2 83   GFR Estimate If Black      >60 mL/min/1.73:m2 >90   Calcium      8.5 - 10.1 mg/dL 8.6   Cholesterol      <200 mg/dL 124   Triglycerides      <150 mg/dL 149   HDL Cholesterol      >39 mg/dL 39 (L)   LDL Cholesterol Calculated      <100 mg/dL 55   Non HDL Cholesterol      <130 mg/dL 85   LDL is great, down to 55.   BMP is unremarkable except fasting glucose is just minimally elevated at 103.   BP is well controlled today at 108/62.   HR is a little slower today.  No sxs of LH, dizziness, near syncope, or syncope.   He has gained some weight over the past COVID year.   With activities, he is having no chest discomfort or other ischemic symptoms, no dyspnea and is without orthopnea, edema, palpitations, dizziness, or syncope. He denies myalgias or muscle weakness. He is without claudication or focal neurologic symptoms.   No bleeding concerns.   He states he is tolerating his medications well.      ROS:  Skin:  Negative     Eyes:  Positive for glasses  ENT:  Positive for hearing loss  Respiratory:  Negative    Cardiovascular:  Negative    Gastroenterology: Negative    Genitourinary:  Positive for urinary frequency;hesitancy  Musculoskeletal:  Positive for back pain;neck pain;joint pain;arthritis  Neurologic:  Negative    Psychiatric:  Negative    Heme/Lymph/Imm:  Positive for allergies  Endocrine:  Negative      PAST MEDICAL HISTORY:  Past Medical History:   Diagnosis Date     AMI anterior wall (H) 6/12/2012     CAD (coronary artery disease) 6/12/2012 6/2012 - Anterior MI with Thrombectomy, PCI and JULIA to LAD 7/2012 - PCI and JULIA to LCx      Cardiomyopathy, ischemic      Coronary artery disease     MI 6/12/12 with thrombectomy and drug eluding STENT to LAD     Hydrocele      Hyperlipidaemia      Hyperlipidemia      Diagnosis updated by automated process. Provider to review and confirm.      Myocardial infarction (H) 2012    Anterior     S/P primary angioplasty with coronary stent 2012       PAST SURGICAL HISTORY:  Past Surgical History:   Procedure Laterality Date     HEART CATH, ANGIOPLASTY  2012    JULIA stenting to LAD     HEART CATH, ANGIOPLASTY  2012    taged cath JULIA stenting to CFX     HERNIA REPAIR       ORTHOPEDIC SURGERY      Left hip replacement       SOCIAL HISTORY:  Social History     Socioeconomic History     Marital status:      Spouse name: Not on file     Number of children: Not on file     Years of education: Not on file     Highest education level: Not on file   Occupational History     Not on file   Social Needs     Financial resource strain: Not on file     Food insecurity     Worry: Not on file     Inability: Not on file     Transportation needs     Medical: Not on file     Non-medical: Not on file   Tobacco Use     Smoking status: Former Smoker     Quit date: 2012     Years since quittin.3     Smokeless tobacco: Former User     Tobacco comment: Vaping currently   Substance and Sexual Activity     Alcohol use: Yes     Alcohol/week: 0.0 standard drinks     Comment: occasionally     Drug use: No     Sexual activity: Yes     Partners: Female   Lifestyle     Physical activity     Days per week: Not on file     Minutes per session: Not on file     Stress: Not on file   Relationships     Social connections     Talks on phone: Not on file     Gets together: Not on file     Attends Druze service: Not on file     Active member of club or organization: Not on file     Attends meetings of clubs or organizations: Not on file     Relationship status: Not on file     Intimate partner violence     Fear of current or ex partner: Not on file     Emotionally abused: Not on file     Physically abused: Not on file     Forced sexual activity: Not on file   Other Topics Concern     Parent/sibling w/ CABG, MI or angioplasty before 65F 55M? Not Asked      Service Not  "Asked     Blood Transfusions Not Asked     Caffeine Concern No     Comment: 2-3 cups per day     Occupational Exposure Not Asked     Hobby Hazards Not Asked     Sleep Concern Not Asked     Stress Concern No     Weight Concern Not Asked     Special Diet Yes     Comment: trying to eat healthier - lower carbs     Back Care Not Asked     Exercise Yes     Comment: walking-  3-4 times per week     Bike Helmet Not Asked     Seat Belt Not Asked     Self-Exams Not Asked   Social History Narrative     Not on file       FAMILY HISTORY:  Family History   Problem Relation Age of Onset     Hypertension Father      Prostate Cancer Father      Ovarian Cancer Mother        MEDS: aspirin EC 81 MG EC tablet, Take 1 tablet by mouth daily.  tamsulosin (FLOMAX) 0.4 MG capsule, Take 0.4 mg by mouth daily  ampicillin (PRINCIPEN) 500 MG capsule, Take 500 mg by mouth 4 tablets prior to dental appointments  nitroglycerin (NITROSTAT) 0.4 MG SL tablet, Place 1 tablet (0.4 mg) under the tongue every 5 minutes as needed for chest pain (Patient not taking: Reported on 4/8/2021)    No current facility-administered medications on file prior to visit.       ALLERGIES:   Allergies   Allergen Reactions     Clindamycin Hcl Rash     Hydrocodone-Acetaminophen Itching     Warfarin Rash       PHYSICAL EXAM:  Vitals: /62 (BP Location: Right arm, Patient Position: Sitting, Cuff Size: Adult Regular)   Pulse 51   Ht 1.727 m (5' 7.99\")   Wt 103.4 kg (228 lb)   SpO2 94%   BMI 34.68 kg/m    Constitutional:  cooperative, alert and oriented, well developed, well nourished, in no acute distress obese      Skin:  warm and dry to the touch, no apparent skin lesions or masses noted        Head:  normocephalic, no masses or lesions        Eyes:  pupils equal and round;conjunctivae and lids unremarkable;sclera white        ENT:  no pallor or cyanosis        Neck:  JVP normal;no carotid bruit        Respiratory:  normal breath sounds, clear to auscultation, " normal A-P diameter, normal symmetry, normal respiratory excursion, no use of accessory muscles        Cardiac: regular rhythm, normal S1/S2, no S3 or S4, apical impulse not displaced, no murmurs, gallops or rubs                  GI:  abdomen soft;BS normoactive obese      Vascular: pulses full and equal                                      Extremities and Musculoskeletal:  no deformities, clubbing, cyanosis, erythema observed;no edema stasis pigmentation      Neurological:  no gross motor deficits;affect appropriate            LABS/DATA:  I reviewed the following:  Component      Latest Ref Rng & Units 4/8/2021   Sodium      133 - 144 mmol/L 138   Potassium      3.4 - 5.3 mmol/L 4.2   Chloride      94 - 109 mmol/L 107   Carbon Dioxide      20 - 32 mmol/L 29   Anion Gap      3 - 14 mmol/L 2 (L)   Glucose      70 - 99 mg/dL 103 (H)   Urea Nitrogen      7 - 30 mg/dL 7   Creatinine      0.66 - 1.25 mg/dL 0.88   GFR Estimate      >60 mL/min/1.73:m2 83   GFR Estimate If Black      >60 mL/min/1.73:m2 >90   Calcium      8.5 - 10.1 mg/dL 8.6   Cholesterol      <200 mg/dL 124   Triglycerides      <150 mg/dL 149   HDL Cholesterol      >39 mg/dL 39 (L)   LDL Cholesterol Calculated      <100 mg/dL 55   Non HDL Cholesterol      <130 mg/dL 85         ASSESSMENT/PLAN:  1-coronary artery disease.  History of anterior MI treated with drug-eluting stent in 2012.  Also staged stenting of the circ.    Negative stress study in 2017.   Currently without ischemic symptoms.   Doing appropriate risk factor intervention. Have recommended continued current regimen.      2-ischemic cardiomyopathy. Ejection fraction around 45-50%.   No heart failure, arrhythmia, or other symptoms, and no evidence of volume overload. He will continue on carvedilol and lisinopril.     3-hyperlipidemia.  FLP today shows LDL at goal (55) on his current dose of atorvastatin 40 mg.  HDL is a little low at 39, TC good at 124 and TG just WNL at 149.        4-hypertension.  BP controlled on lisinopril 2.5 mg, Coreg 6.25 mg BID.    BMP today reviewed and looks good.     5-risk factor intervention..  Dietary recommendations reviewed in detail today.   He remains abstinent cigarettes.  He does vape, and we discussed that it would be best if he stopped this.  Blood pressure controlled.  On statin therapy and LDL at goal.  Glucose is just minimally elevated at 103.  We discussed dietary changes and exercise, weight loss.         Meds refilled for the year.       Follow up:  1 year with new cardiologist, FLP, ALT, BMP      Nathan Rasmussen PA-C

## 2021-05-09 ENCOUNTER — HEALTH MAINTENANCE LETTER (OUTPATIENT)
Age: 76
End: 2021-05-09

## 2021-10-24 ENCOUNTER — HEALTH MAINTENANCE LETTER (OUTPATIENT)
Age: 76
End: 2021-10-24

## 2022-05-12 ENCOUNTER — LAB (OUTPATIENT)
Dept: LAB | Facility: CLINIC | Age: 77
End: 2022-05-12
Payer: COMMERCIAL

## 2022-05-12 ENCOUNTER — OFFICE VISIT (OUTPATIENT)
Dept: CARDIOLOGY | Facility: CLINIC | Age: 77
End: 2022-05-12
Payer: COMMERCIAL

## 2022-05-12 VITALS
HEART RATE: 50 BPM | DIASTOLIC BLOOD PRESSURE: 66 MMHG | WEIGHT: 233.5 LBS | OXYGEN SATURATION: 94 % | HEIGHT: 68 IN | BODY MASS INDEX: 35.39 KG/M2 | SYSTOLIC BLOOD PRESSURE: 128 MMHG

## 2022-05-12 DIAGNOSIS — E78.00 PURE HYPERCHOLESTEROLEMIA: ICD-10-CM

## 2022-05-12 DIAGNOSIS — Z13.6 SCREENING FOR HEART DISEASE: Primary | ICD-10-CM

## 2022-05-12 DIAGNOSIS — I25.5 CARDIOMYOPATHY, ISCHEMIC: ICD-10-CM

## 2022-05-12 DIAGNOSIS — I25.10 CORONARY ARTERY DISEASE INVOLVING NATIVE CORONARY ARTERY OF NATIVE HEART WITHOUT ANGINA PECTORIS: ICD-10-CM

## 2022-05-12 DIAGNOSIS — I10 ESSENTIAL HYPERTENSION: ICD-10-CM

## 2022-05-12 LAB
ALT SERPL W P-5'-P-CCNC: 19 U/L (ref 0–70)
ANION GAP SERPL CALCULATED.3IONS-SCNC: 3 MMOL/L (ref 3–14)
BUN SERPL-MCNC: 10 MG/DL (ref 7–30)
CALCIUM SERPL-MCNC: 8.7 MG/DL (ref 8.5–10.1)
CHLORIDE BLD-SCNC: 108 MMOL/L (ref 94–109)
CHOLEST SERPL-MCNC: 142 MG/DL
CO2 SERPL-SCNC: 27 MMOL/L (ref 20–32)
CREAT SERPL-MCNC: 0.84 MG/DL (ref 0.66–1.25)
FASTING STATUS PATIENT QL REPORTED: YES
GFR SERPL CREATININE-BSD FRML MDRD: 89 ML/MIN/1.73M2
GLUCOSE BLD-MCNC: 103 MG/DL (ref 70–99)
HDLC SERPL-MCNC: 38 MG/DL
LDLC SERPL CALC-MCNC: 72 MG/DL
NONHDLC SERPL-MCNC: 104 MG/DL
POTASSIUM BLD-SCNC: 4.1 MMOL/L (ref 3.4–5.3)
SODIUM SERPL-SCNC: 138 MMOL/L (ref 133–144)
TRIGL SERPL-MCNC: 160 MG/DL

## 2022-05-12 PROCEDURE — 36415 COLL VENOUS BLD VENIPUNCTURE: CPT | Performed by: PHYSICIAN ASSISTANT

## 2022-05-12 PROCEDURE — 99214 OFFICE O/P EST MOD 30 MIN: CPT | Performed by: INTERNAL MEDICINE

## 2022-05-12 PROCEDURE — 80048 BASIC METABOLIC PNL TOTAL CA: CPT | Performed by: PHYSICIAN ASSISTANT

## 2022-05-12 PROCEDURE — 84460 ALANINE AMINO (ALT) (SGPT): CPT | Performed by: PHYSICIAN ASSISTANT

## 2022-05-12 PROCEDURE — 93000 ELECTROCARDIOGRAM COMPLETE: CPT | Performed by: INTERNAL MEDICINE

## 2022-05-12 PROCEDURE — 80061 LIPID PANEL: CPT | Performed by: PHYSICIAN ASSISTANT

## 2022-05-12 NOTE — PROGRESS NOTES
HISTORY:    Lion Chaudhari is a pleasant 76-year-old gentleman previously cared for by Dr. Kincaid.  He has a history of coronary artery disease, presenting late after an anterior infarct in 2012 and undergoing stenting of his LAD followed by staged stenting of his circumflex.  His ejection fraction is modestly reduced to 40 to 45%, last measured in October 2013.    Today Lion reports that in general he has done well over the last year.  He has not experienced any exertional chest arm neck shoulder or jaw discomfort and denies symptoms of palpitations, PND/orthopnea, syncope or near syncope, strokelike symptoms, or claudication.  He has noticed some peripheral edema.  He has had some edema for quite some time and has some venous stasis changes of his skin.  Today he has 1+ edema bilaterally.  Previous exams by others have rated this 2+.  He thinks that his edema is a little bit worse over the last week.  He also admits that he has changed his diet, eating less fruits and vegetables and more salted meat in the recent weeks and months.    Lipid profile done today is reviewed with him.  The total cholesterol is 142 with an HDL of 38, LDL 72, triglyceride 160.  His last cholesterol value was 124 with an LDL of 55, but he states that the time he had gotten a new juicer and was using lots of fruits and vegetables which he has since cut back on.  Cholesterol values prior to last year were substantially higher.      ASSESSMENT/PLAN:    1.  Coronary artery disease.  The patient remains physically active without any vigorous size.  He has not experienced exertional chest pain.  Given his age of 76 years I do not think further stress testing is warranted but he will let me know should he experience recurrent chest pain.  2.  Hyperlipidemia.  Lipid values have been mostly above target levels of LDL 70.  I offered to increase his Lipitor to 80 mg which I think would help to lower his LDL without causing further side effects or  issues.  He would prefer to stay on his current dose.  3.  Ischemic cardiomyopathy.  Patient is using low-dose lisinopril and carvedilol.  Carvedilol dose is limited by his heart rate of just 50, and lisinopril doses limited by his blood pressure.  His blood pressure today is 128 systolic but in the past it has often been between 100-110.  No med changes made today.  An echocardiogram will be planned for his next annual visit to reevaluate his ejection fraction.    Thank you for inviting me to participate in the care of your patient.  Please don't hesitate to call if I can be of further assistance.  32 minutes were spent today reviewing the chart and other records, interviewing and examining the patient, and documenting our visit.    Chart documentation was completed, in part, with [employer] voice-recognition software. Even though reviewed, some grammatical, spelling, and word errors may remain.       Orders Placed This Encounter   Procedures     Follow-Up with Cardiology LANDEN     EKG 12-lead complete w/read - Clinics (performed today)     No orders of the defined types were placed in this encounter.    There are no discontinued medications.    10 year ASCVD risk: The ASCVD Risk score ([place] Jr., et al., 2013) failed to calculate for the following reasons:    The patient has a prior MI or stroke diagnosis    Encounter Diagnoses   Name Primary?     Screening for heart disease Yes     Coronary artery disease involving native coronary artery of native heart without angina pectoris      Pure hypercholesterolemia        CURRENT MEDICATIONS:  Current Outpatient Medications   Medication Sig Dispense Refill     ampicillin (PRINCIPEN) 500 MG capsule Take 500 mg by mouth 4 tablets prior to dental appointments       aspirin EC 81 MG EC tablet Take 1 tablet by mouth daily. 90 tablet 3     atorvastatin (LIPITOR) 40 MG tablet Take 1 tablet (40 mg) by mouth daily 90 tablet 3     carvedilol (COREG) 6.25 MG tablet Take 1 tablet (6.25  mg) by mouth 2 times daily 180 tablet 3     lisinopril (ZESTRIL) 2.5 MG tablet Take 1 tablet (2.5 mg) by mouth daily 90 tablet 3     nitroglycerin (NITROSTAT) 0.4 MG SL tablet Place 1 tablet (0.4 mg) under the tongue every 5 minutes as needed for chest pain 25 tablet 3     tamsulosin (FLOMAX) 0.4 MG capsule Take 0.4 mg by mouth daily         ALLERGIES     Allergies   Allergen Reactions     Clindamycin Hcl Rash     Hydrocodone-Acetaminophen Itching     Warfarin Rash       PAST MEDICAL HISTORY:  Past Medical History:   Diagnosis Date     AMI anterior wall (H) 2012     CAD (coronary artery disease) 2012 - Anterior MI with Thrombectomy, PCI and JULIA to LAD 2012 - PCI and JULIA to LCx      Cardiomyopathy, ischemic      Coronary artery disease     MI 12 with thrombectomy and drug eluding STENT to LAD     Hydrocele      Hyperlipidaemia      Hyperlipidemia      Diagnosis updated by automated process. Provider to review and confirm.     Myocardial infarction (H) 2012    Anterior     S/P primary angioplasty with coronary stent 2012       PAST SURGICAL HISTORY:  Past Surgical History:   Procedure Laterality Date     HEART CATH, ANGIOPLASTY  2012    JULIA stenting to LAD     HEART CATH, ANGIOPLASTY  2012    taged cath JULIA stenting to CFX     HERNIA REPAIR       ORTHOPEDIC SURGERY      Left hip replacement       FAMILY HISTORY:  Family History   Problem Relation Age of Onset     Hypertension Father      Prostate Cancer Father      Ovarian Cancer Mother        SOCIAL HISTORY:  Social History     Socioeconomic History     Marital status:    Tobacco Use     Smoking status: Former Smoker     Quit date: 2012     Years since quittin.4     Smokeless tobacco: Former User     Tobacco comment: Vaping currently   Substance and Sexual Activity     Alcohol use: Yes     Alcohol/week: 0.0 standard drinks     Comment: occasionally     Drug use: No     Sexual activity: Yes     Partners: Female  "  Other Topics Concern     Caffeine Concern No     Comment: 2-3 cups per day     Stress Concern No     Special Diet Yes     Comment: trying to eat healthier - lower carbs     Exercise Yes     Comment: walking-  3-4 times per week       Review of Systems:  Skin:  Negative     Eyes:  Positive for glasses  ENT:  Positive for hearing loss  Respiratory:  Negative    Cardiovascular:    Positive for;edema  Gastroenterology: Negative    Genitourinary:  Positive for urinary frequency;hesitancy  Musculoskeletal:  Positive for back pain;neck pain;joint pain;arthritis  Neurologic:  Negative    Psychiatric:  Negative    Heme/Lymph/Imm:  Positive for allergies  Endocrine:  Negative      Physical Exam:  Vitals: /66 (BP Location: Right arm, Patient Position: Sitting, Cuff Size: Adult Regular)   Pulse 50   Ht 1.727 m (5' 8\")   Wt 105.9 kg (233 lb 8 oz)   SpO2 94%   BMI 35.50 kg/m      Constitutional:  cooperative, alert and oriented, well developed, well nourished, in no acute distress obese      Skin:  warm and dry to the touch, no apparent skin lesions or masses noted        Head:  normocephalic, no masses or lesions        Eyes:  pupils equal and round;sclera white        ENT:  no pallor or cyanosis   masked    Neck:  carotid pulses are full and equal bilaterally, JVP normal, no carotid bruit        Chest:  normal breath sounds, clear to auscultation, normal A-P diameter, normal symmetry, normal respiratory excursion, no use of accessory muscles        Cardiac: regular rhythm, normal S1/S2, no S3 or S4, apical impulse not displaced, no murmurs, gallops or rubs                  Abdomen:  abdomen soft;BS normoactive obese      Vascular: pulses full and equal                                      Extremities and Muscular Skeletal:   stasis pigmentation    bilateral LE edema;1+;pitting     Neurological:  no gross motor deficits        Psych:  affect appropriate, oriented to time, person and place     Recent Lab " Results:  LIPID RESULTS:  Lab Results   Component Value Date    CHOL 142 05/12/2022    CHOL 124 04/08/2021    HDL 38 (L) 05/12/2022    HDL 39 (L) 04/08/2021    LDL 72 05/12/2022    LDL 55 04/08/2021    TRIG 160 (H) 05/12/2022    TRIG 149 04/08/2021    CHOLHDLRATIO 3.9 10/29/2015       LIVER ENZYME RESULTS:  Lab Results   Component Value Date    ALT 19 05/12/2022    ALT 22 04/09/2020       CBC RESULTS:  Lab Results   Component Value Date    WBC 5.5 07/02/2012    RBC 4.51 07/02/2012    HGB 14.8 07/02/2012    HCT 43.3 07/02/2012    MCV 96 07/02/2012    MCH 32.8 07/02/2012    MCHC 34.2 07/02/2012    RDW 13.0 07/02/2012     07/02/2012       BMP RESULTS:  Lab Results   Component Value Date     05/12/2022     04/08/2021    POTASSIUM 4.1 05/12/2022    POTASSIUM 4.2 04/08/2021    CHLORIDE 108 05/12/2022    CHLORIDE 107 04/08/2021    CO2 27 05/12/2022    CO2 29 04/08/2021    ANIONGAP 3 05/12/2022    ANIONGAP 2 (L) 04/08/2021     (H) 05/12/2022     (H) 04/08/2021    BUN 10 05/12/2022    BUN 7 04/08/2021    CR 0.84 05/12/2022    CR 0.88 04/08/2021    GFRESTIMATED 89 05/12/2022    GFRESTIMATED 83 04/08/2021    GFRESTBLACK >90 04/08/2021    JENNIFER 8.7 05/12/2022    JENNIFER 8.6 04/08/2021        A1C RESULTS:  No results found for: A1C    INR RESULTS:  Lab Results   Component Value Date    INR 1.03 07/02/2012    INR 1.03 06/11/2012         Dakota Yee MD, FACC    CC  Nathan Rasmussen, PA-C  [address] [place] [address]

## 2022-05-12 NOTE — LETTER
5/12/2022    Jarett Jose MD  [facility] [address] [place] [address]    RE: Lion Chaudhari       Dear Colleague,     I had the pleasure of seeing Lion Chaudhari in the [facility].  HISTORY:    Lion Chaudhari is a pleasant 76-year-old gentleman previously cared for by Dr. Kincaid.  He has a history of coronary artery disease, presenting late after an anterior infarct in 2012 and undergoing stenting of his LAD followed by staged stenting of his circumflex.  His ejection fraction is modestly reduced to 40 to 45%, last measured in October 2013.    Today Lion reports that in general he has done well over the last year.  He has not experienced any exertional chest arm neck shoulder or jaw discomfort and denies symptoms of palpitations, PND/orthopnea, syncope or near syncope, strokelike symptoms, or claudication.  He has noticed some peripheral edema.  He has had some edema for quite some time and has some venous stasis changes of his skin.  Today he has 1+ edema bilaterally.  Previous exams by others have rated this 2+.  He thinks that his edema is a little bit worse over the last week.  He also admits that he has changed his diet, eating less fruits and vegetables and more salted meat in the recent weeks and months.    Lipid profile done today is reviewed with him.  The total cholesterol is 142 with an HDL of 38, LDL 72, triglyceride 160.  His last cholesterol value was 124 with an LDL of 55, but he states that the time he had gotten a new juicer and was using lots of fruits and vegetables which he has since cut back on.  Cholesterol values prior to last year were substantially higher.      ASSESSMENT/PLAN:    1.  Coronary artery disease.  The patient remains physically active without any vigorous size.  He has not experienced exertional chest pain.  Given his age of 76 years I do not think further stress testing is warranted but he will let me know should he experience  recurrent chest pain.  2.  Hyperlipidemia.  Lipid values have been mostly above target levels of LDL 70.  I offered to increase his Lipitor to 80 mg which I think would help to lower his LDL without causing further side effects or issues.  He would prefer to stay on his current dose.  3.  Ischemic cardiomyopathy.  Patient is using low-dose lisinopril and carvedilol.  Carvedilol dose is limited by his heart rate of just 50, and lisinopril doses limited by his blood pressure.  His blood pressure today is 128 systolic but in the past it has often been between 100-110.  No med changes made today.  An echocardiogram will be planned for his next annual visit to reevaluate his ejection fraction.    Thank you for inviting me to participate in the care of your patient.  Please don't hesitate to call if I can be of further assistance.  32 minutes were spent today reviewing the chart and other records, interviewing and examining the patient, and documenting our visit.    Chart documentation was completed, in part, with [employer] voice-recognition software. Even though reviewed, some grammatical, spelling, and word errors may remain.       Orders Placed This Encounter   Procedures     Follow-Up with Cardiology LANDEN     EKG 12-lead complete w/read - Clinics (performed today)     No orders of the defined types were placed in this encounter.    There are no discontinued medications.    10 year ASCVD risk: The ASCVD Risk score (Tiffany SRIKANTH Jr., et al., 2013) failed to calculate for the following reasons:    The patient has a prior MI or stroke diagnosis    Encounter Diagnoses   Name Primary?     Screening for heart disease Yes     Coronary artery disease involving native coronary artery of native heart without angina pectoris      Pure hypercholesterolemia        CURRENT MEDICATIONS:  Current Outpatient Medications   Medication Sig Dispense Refill     ampicillin (PRINCIPEN) 500 MG capsule Take 500 mg by mouth 4 tablets prior to dental  appointments       aspirin EC 81 MG EC tablet Take 1 tablet by mouth daily. 90 tablet 3     atorvastatin (LIPITOR) 40 MG tablet Take 1 tablet (40 mg) by mouth daily 90 tablet 3     carvedilol (COREG) 6.25 MG tablet Take 1 tablet (6.25 mg) by mouth 2 times daily 180 tablet 3     lisinopril (ZESTRIL) 2.5 MG tablet Take 1 tablet (2.5 mg) by mouth daily 90 tablet 3     nitroglycerin (NITROSTAT) 0.4 MG SL tablet Place 1 tablet (0.4 mg) under the tongue every 5 minutes as needed for chest pain 25 tablet 3     tamsulosin (FLOMAX) 0.4 MG capsule Take 0.4 mg by mouth daily         ALLERGIES     Allergies   Allergen Reactions     Clindamycin Hcl Rash     Hydrocodone-Acetaminophen Itching     Warfarin Rash       PAST MEDICAL HISTORY:  Past Medical History:   Diagnosis Date     AMI anterior wall (H) 2012     CAD (coronary artery disease) 2012 - Anterior MI with Thrombectomy, PCI and JULIA to LAD 2012 - PCI and JULIA to LCx      Cardiomyopathy, ischemic      Coronary artery disease     MI 12 with thrombectomy and drug eluding STENT to LAD     Hydrocele      Hyperlipidaemia      Hyperlipidemia      Diagnosis updated by automated process. Provider to review and confirm.     Myocardial infarction (H) 2012    Anterior     S/P primary angioplasty with coronary stent 2012       PAST SURGICAL HISTORY:  Past Surgical History:   Procedure Laterality Date     HEART CATH, ANGIOPLASTY  2012    JULIA stenting to LAD     HEART CATH, ANGIOPLASTY  2012    taged cath JULIA stenting to CFX     HERNIA REPAIR       ORTHOPEDIC SURGERY      Left hip replacement       FAMILY HISTORY:  Family History   Problem Relation Age of Onset     Hypertension Father      Prostate Cancer Father      Ovarian Cancer Mother        SOCIAL HISTORY:  Social History     Socioeconomic History     Marital status:    Tobacco Use     Smoking status: Former Smoker     Quit date: 2012     Years since quittin.4     Smokeless  "tobacco: Former User     Tobacco comment: Vaping currently   Substance and Sexual Activity     Alcohol use: Yes     Alcohol/week: 0.0 standard drinks     Comment: occasionally     Drug use: No     Sexual activity: Yes     Partners: Female   Other Topics Concern     Caffeine Concern No     Comment: 2-3 cups per day     Stress Concern No     Special Diet Yes     Comment: trying to eat healthier - lower carbs     Exercise Yes     Comment: walking-  3-4 times per week       Review of Systems:  Skin:  Negative     Eyes:  Positive for glasses  ENT:  Positive for hearing loss  Respiratory:  Negative    Cardiovascular:    Positive for;edema  Gastroenterology: Negative    Genitourinary:  Positive for urinary frequency;hesitancy  Musculoskeletal:  Positive for back pain;neck pain;joint pain;arthritis  Neurologic:  Negative    Psychiatric:  Negative    Heme/Lymph/Imm:  Positive for allergies  Endocrine:  Negative      Physical Exam:  Vitals: /66 (BP Location: Right arm, Patient Position: Sitting, Cuff Size: Adult Regular)   Pulse 50   Ht 1.727 m (5' 8\")   Wt 105.9 kg (233 lb 8 oz)   SpO2 94%   BMI 35.50 kg/m      Constitutional:  cooperative, alert and oriented, well developed, well nourished, in no acute distress obese      Skin:  warm and dry to the touch, no apparent skin lesions or masses noted        Head:  normocephalic, no masses or lesions        Eyes:  pupils equal and round;sclera white        ENT:  no pallor or cyanosis   masked    Neck:  carotid pulses are full and equal bilaterally, JVP normal, no carotid bruit        Chest:  normal breath sounds, clear to auscultation, normal A-P diameter, normal symmetry, normal respiratory excursion, no use of accessory muscles        Cardiac: regular rhythm, normal S1/S2, no S3 or S4, apical impulse not displaced, no murmurs, gallops or rubs                  Abdomen:  abdomen soft;BS normoactive obese      Vascular: pulses full and equal                              "         Extremities and Muscular Skeletal:   stasis pigmentation    bilateral LE edema;1+;pitting     Neurological:  no gross motor deficits        Psych:  affect appropriate, oriented to time, person and place     Recent Lab Results:  LIPID RESULTS:  Lab Results   Component Value Date    CHOL 142 05/12/2022    CHOL 124 04/08/2021    HDL 38 (L) 05/12/2022    HDL 39 (L) 04/08/2021    LDL 72 05/12/2022    LDL 55 04/08/2021    TRIG 160 (H) 05/12/2022    TRIG 149 04/08/2021    CHOLHDLRATIO 3.9 10/29/2015       LIVER ENZYME RESULTS:  Lab Results   Component Value Date    ALT 19 05/12/2022    ALT 22 04/09/2020       CBC RESULTS:  Lab Results   Component Value Date    WBC 5.5 07/02/2012    RBC 4.51 07/02/2012    HGB 14.8 07/02/2012    HCT 43.3 07/02/2012    MCV 96 07/02/2012    MCH 32.8 07/02/2012    MCHC 34.2 07/02/2012    RDW 13.0 07/02/2012     07/02/2012       BMP RESULTS:  Lab Results   Component Value Date     05/12/2022     04/08/2021    POTASSIUM 4.1 05/12/2022    POTASSIUM 4.2 04/08/2021    CHLORIDE 108 05/12/2022    CHLORIDE 107 04/08/2021    CO2 27 05/12/2022    CO2 29 04/08/2021    ANIONGAP 3 05/12/2022    ANIONGAP 2 (L) 04/08/2021     (H) 05/12/2022     (H) 04/08/2021    BUN 10 05/12/2022    BUN 7 04/08/2021    CR 0.84 05/12/2022    CR 0.88 04/08/2021    GFRESTIMATED 89 05/12/2022    GFRESTIMATED 83 04/08/2021    GFRESTBLACK >90 04/08/2021    JENNIFER 8.7 05/12/2022    JENNIFER 8.6 04/08/2021        A1C RESULTS:  No results found for: A1C    INR RESULTS:  Lab Results   Component Value Date    INR 1.03 07/02/2012    INR 1.03 06/11/2012     Dakota Yee MD, FACC    CC  Anajon Rasmussen PA-C  [address] [place] [address]    Thank you for allowing me to participate in the care of your patient.    Sincerely,   Dakota Yee MD   [facility]

## 2022-06-05 ENCOUNTER — HEALTH MAINTENANCE LETTER (OUTPATIENT)
Age: 77
End: 2022-06-05

## 2022-07-01 DIAGNOSIS — I25.10 CORONARY ARTERY DISEASE INVOLVING NATIVE HEART WITHOUT ANGINA PECTORIS, UNSPECIFIED VESSEL OR LESION TYPE: ICD-10-CM

## 2022-07-01 DIAGNOSIS — I21.09 AMI ANTERIOR WALL (H): ICD-10-CM

## 2022-07-01 RX ORDER — LISINOPRIL 2.5 MG/1
2.5 TABLET ORAL DAILY
Qty: 90 TABLET | Refills: 3 | Status: SHIPPED | OUTPATIENT
Start: 2022-07-01 | End: 2023-07-10

## 2022-07-01 RX ORDER — CARVEDILOL 6.25 MG/1
6.25 TABLET ORAL 2 TIMES DAILY
Qty: 180 TABLET | Refills: 3 | Status: SHIPPED | OUTPATIENT
Start: 2022-07-01 | End: 2023-07-10

## 2022-07-01 RX ORDER — ATORVASTATIN CALCIUM 40 MG/1
40 TABLET, FILM COATED ORAL DAILY
Qty: 90 TABLET | Refills: 3 | Status: SHIPPED | OUTPATIENT
Start: 2022-07-01 | End: 2023-07-10

## 2022-10-15 ENCOUNTER — HEALTH MAINTENANCE LETTER (OUTPATIENT)
Age: 77
End: 2022-10-15

## 2023-04-06 ENCOUNTER — HOSPITAL ENCOUNTER (OUTPATIENT)
Dept: CARDIOLOGY | Facility: CLINIC | Age: 78
Discharge: HOME | End: 2023-04-06
Attending: INTERNAL MEDICINE | Admitting: INTERNAL MEDICINE
Payer: COMMERCIAL

## 2023-04-06 DIAGNOSIS — I25.5 CARDIOMYOPATHY, ISCHEMIC: ICD-10-CM

## 2023-04-06 LAB — LVEF ECHO: NORMAL

## 2023-04-06 PROCEDURE — 93306 TTE W/DOPPLER COMPLETE: CPT | Mod: 26 | Performed by: INTERNAL MEDICINE

## 2023-04-06 PROCEDURE — 255N000002 HC RX 255 OP 636: Performed by: INTERNAL MEDICINE

## 2023-04-06 PROCEDURE — 999N000208 ECHOCARDIOGRAM COMPLETE

## 2023-04-06 RX ADMIN — HUMAN ALBUMIN MICROSPHERES AND PERFLUTREN 3 ML: 10; .22 INJECTION, SOLUTION INTRAVENOUS at 11:35

## 2023-04-06 NOTE — LETTER
April 10, 2023      Lion Will  [address] [place] [address]        Dear ,    We are writing to inform you of your test results.    Echo unchanged from 5 years ago, no action needed     Resulted Orders   Echocardiogram Complete   Result Value Ref Range    LVEF  40-45%     Narrative    [id number]  [id number]  [id number]  [id number]^ADRIAN^STANFORD^JOSEFINA     [facility]  Echocardiography Laboratory  [address] [place] [address]     Name: LION WILL  MRN: [id number]  : 1945  Study Date: 2023 10:01 AM  Age: 77 yrs  Gender: Male  Patient Location: [facility]  Reason For Study: Cardiomyopathy, ischemic  Ordering Physician: STANFORD CAMPBELL  Referring Physician: STANFORD CAMPBELL  Performed By: Katheryn Lopez RDCS     BSA: 2.2 m2  Height: 68 in  Weight: 233 lb  HR: 70  BP: 128/66 mmHg  ______________________________________________________________________________  Procedure  Complete Echo Adult. Optison (NDC #[id number]) given intravenously.  ______________________________________________________________________________  Interpretation Summary     The visual ejection fraction is 40-45%.  Left ventricular systolic function is mildly reduced.  There are regional wall motion abnormalities as specified.  The study was technically difficult. The study was technically limited.  ______________________________________________________________________________  Left Ventricle  The left ventricle is normal in size. There is mild concentric left  ventricular hypertrophy. Grade I or early diastolic dysfunction. Diastolic  Doppler findings (E/E' ratio and/or other parameters) suggest left ventricular  filling pressures are indeterminate. The visual ejection fraction is 40-45%.  Left ventricular systolic function is mildly reduced. There is apical  akinesis. Distal inferior and distal inferoseptal akinesis. Distal  anteroseptal and distal inferolateral  akinesis. There are regional wall motion  abnormalities as specified.     Right Ventricle  Right ventricular function cannot be assessed due to poor image quality.     Atria  The left atrium is mild to moderately dilated. The right atrium is mild to  moderately dilated.     Mitral Valve  There is mild mitral annular calcification. There is mild (1+) mitral  regurgitation.     Tricuspid Valve  The tricuspid valve is not well visualized. There is trace tricuspid  regurgitation. The right ventricular systolic pressure is approximated at 23.1  mmHg plus the right atrial pressure.     Aortic Valve  The aortic valve is trileaflet. No aortic regurgitation is present. No  hemodynamically significant valvular aortic stenosis.     Pulmonic Valve  The pulmonic valve is not well visualized. There is no pulmonic valvular  regurgitation.     Vessels  Mild aortic root dilatation. The ascending aorta is Mildly dilated. Inferior  vena cava not well visualized for estimation of right atrial pressure.     Pericardium  There is no pericardial effusion.     Rhythm  The rhythm was sinus bradycardia.  ______________________________________________________________________________  MMode/2D Measurements & Calculations  RVDd: 5.0 cm  IVSd: 1.3 cm     LVIDd: 4.1 cm  LVIDs: 2.6 cm  LVPWd: 1.3 cm  FS: 35.5 %  LV mass(C)d: 188.3 grams  LV mass(C)dI: 86.4 grams/m2  Ao root diam: 4.0 cm  LA dimension: 4.3 cm  asc Aorta Diam: 4.2 cm  LA/Ao: 1.1  LVOT diam: 2.4 cm  LVOT area: 4.5 cm2     EF(MOD-bp): 46.3 %  LA Volume Index (BP): 40.4 ml/m2  RWT: 0.65     Doppler Measurements & Calculations  MV E max wendi: 57.7 cm/sec  MV A max wendi: 89.5 cm/sec  MV E/A: 0.64  MV dec slope: 219.0 cm/sec2  MV dec time: 0.26 sec  Ao V2 max: 120.7 cm/sec  Ao max P.8 mmHg  Ao V2 mean: 84.4 cm/sec  Ao mean PG: 3.2 mmHg  Ao V2 VTI: 28.3 cm  MAYDA(I,D): 4.3 cm2  MAYDA(V,D): 4.2 cm2  LV V1 max P.2 mmHg  LV V1 max: 114.1 cm/sec  LV V1 VTI: 27.4 cm  SV(LVOT): 122.1  ml  SI(LVOT): 56.0 ml/m2  PA acc time: 0.05 sec  TR max ozzy: 240.6 cm/sec  TR max P.1 mmHg  AV Ozzy Ratio (DI): 0.95  MAYDA Index (cm2/m2): 2.0  E/E': 10.5  Peak E' Ozzy: 5.5 cm/sec     ______________________________________________________________________________  Report approved by: Micheal Barber 2023 01:41 PM         If you have any questions or concerns, please call the clinic at the number listed above.   Sincerely,      Dakota Yee MD

## 2023-04-13 NOTE — RESULT ENCOUNTER NOTE
Results and recommendations routed Via My Chart with call back information if needed.   Echo unchanged from 5 years ago, no action needed

## 2023-05-01 ENCOUNTER — OFFICE VISIT (OUTPATIENT)
Dept: CARDIOLOGY | Facility: CLINIC | Age: 78
End: 2023-05-01
Attending: INTERNAL MEDICINE
Payer: COMMERCIAL

## 2023-05-01 ENCOUNTER — LAB (OUTPATIENT)
Dept: LAB | Facility: CLINIC | Age: 78
End: 2023-05-01
Payer: COMMERCIAL

## 2023-05-01 VITALS
HEART RATE: 60 BPM | OXYGEN SATURATION: 93 % | WEIGHT: 231.6 LBS | BODY MASS INDEX: 35.1 KG/M2 | SYSTOLIC BLOOD PRESSURE: 119 MMHG | HEIGHT: 68 IN | DIASTOLIC BLOOD PRESSURE: 73 MMHG

## 2023-05-01 DIAGNOSIS — E78.00 PURE HYPERCHOLESTEROLEMIA: ICD-10-CM

## 2023-05-01 DIAGNOSIS — I25.10 CORONARY ARTERY DISEASE INVOLVING NATIVE CORONARY ARTERY OF NATIVE HEART WITHOUT ANGINA PECTORIS: ICD-10-CM

## 2023-05-01 LAB
ALT SERPL W P-5'-P-CCNC: 17 U/L (ref 10–50)
ANION GAP SERPL CALCULATED.3IONS-SCNC: 8 MMOL/L (ref 7–15)
BUN SERPL-MCNC: 10.3 MG/DL (ref 8–23)
CALCIUM SERPL-MCNC: 9.1 MG/DL (ref 8.8–10.2)
CHLORIDE SERPL-SCNC: 103 MMOL/L (ref 98–107)
CHOLEST SERPL-MCNC: 152 MG/DL
CREAT SERPL-MCNC: 0.88 MG/DL (ref 0.67–1.17)
DEPRECATED HCO3 PLAS-SCNC: 26 MMOL/L (ref 22–29)
GFR SERPL CREATININE-BSD FRML MDRD: 87 ML/MIN/1.73M2
GLUCOSE SERPL-MCNC: 104 MG/DL (ref 70–99)
HDLC SERPL-MCNC: 38 MG/DL
LDLC SERPL CALC-MCNC: 78 MG/DL
NONHDLC SERPL-MCNC: 114 MG/DL
POTASSIUM SERPL-SCNC: 4.1 MMOL/L (ref 3.4–5.3)
SODIUM SERPL-SCNC: 137 MMOL/L (ref 136–145)
TRIGL SERPL-MCNC: 181 MG/DL

## 2023-05-01 PROCEDURE — 99215 OFFICE O/P EST HI 40 MIN: CPT | Performed by: INTERNAL MEDICINE

## 2023-05-01 PROCEDURE — 80061 LIPID PANEL: CPT | Performed by: INTERNAL MEDICINE

## 2023-05-01 PROCEDURE — 84460 ALANINE AMINO (ALT) (SGPT): CPT | Performed by: INTERNAL MEDICINE

## 2023-05-01 PROCEDURE — 80048 BASIC METABOLIC PNL TOTAL CA: CPT | Performed by: INTERNAL MEDICINE

## 2023-05-01 PROCEDURE — 36415 COLL VENOUS BLD VENIPUNCTURE: CPT | Performed by: INTERNAL MEDICINE

## 2023-05-01 NOTE — PATIENT INSTRUCTIONS
Call the nurse for any questions or concerns at [phone]    Plan:  1. Medication changes: None    2. Labs (lipid panel/ALT, BMP) today    3. Follow up: with Dr. Yee in one year   -Scheduling phone number: [phone]    It was great seeing you today!    Alysia Moon PA-C  Physician Assistant  [facility]

## 2023-05-01 NOTE — LETTER
5/1/2023    Jarett Jose MD  [facility] [address] [place] [address]    RE: Lion Chaudhari       Dear Colleague,     I had the pleasure of seeing Lion Chaudhari in the [facility].  Cardiology Clinic Progress Note  Lion Chaudhari MRN# [id number]   YOB: 1945 Age: 77 year old   Primary Cardiologist: Dr. Yee Reason for visit: Annual follow-up            Assessment and Plan:   Lion Chaudhari is a very pleasant 77 year old male who is here today for annual follow-up.    Coronary artery disease  -Acute MI of anterior wall 6/2012 s/p emergent thrombectomy and JULIA to mLAD  -s/p staged PCI with JULIA to Cx 7/2012  -On ASA 81 mg daily, atorvastatin, carvedilol, lisinopril  Ischemic cardiomyopathy  - LVEF: 40-45% on echo 4/6/2023 with apical akinesis, distal inferior and distal inferoseptal akinesis, distal anteroseptal and distal inferolateral akinesis (unchanged from prior echo 2013)  - Fluid status: Euvolemic  - Diuretic regimen: None  - Guideline directed medical therapy:  - Beta blocker:  Carvedilol 6.25 mg twice daily  - ACEI/ARB/ARNI: Lisinopril 2.5 mg daily  - Aldactone antagonist:  None  - SGLT2 inhibitor: None  -Counseled patient on fluid and sodium restriction  Hyperlipidemia  -LDL 72 5/2022  -On atorvastatin 40 mg daily; has declined increase in dose  -Repeat lipid panel/ALT today      Changes today: None    Follow up plan:  BMP and lipid panel/ALT today (patient is fasting).  We will contact him with the results once they become available.  Follow-up with Dr. Yee in 1 year, sooner if needed    XIOMARA Tolbert [facility] - [facility] Care  Pager: [phone]          History of Presenting Illness:    Lion Chaudhari is a very pleasant 77 year old male with a history of coronary artery disease suffering an acute MI of anterior wall 6/2012 status post emergent thrombectomy and JULIA to mid LAD, status post staged PCI with JULIA to  circumflex 7/2012, ischemic cardiomyopathy with LVEF 40 to 45%, and hyperlipidemia.    Patient has continued to do well since 2012.  Cardiac status has remained stable on current doses of carvedilol, lisinopril, aspirin, and atorvastatin.      Echocardiogram was completed 4/6/2023 revealing LVEF 40 to 45%, apical akinesis, distal inferior and distal inferoseptal akinesis, distal anteroseptal and distal inferolateral akinesis.  Unchanged from prior echocardiogram completed in 2013.    Patient is here today for annual follow-up.  He has been doing very well over the past year.  Denies episodes of chest pain, lightheadedness, dizziness, orthopnea, PND, near-syncope and syncope.  He continues to have dyspnea with exertion but reports this is chronic and has been stable for several years.  He takes all of his medications daily as prescribed.    Blood pressure 119/73 and heart rate 60 in clinic today.  He is recently made drastic changes to his diet.  Used to eat lots of red meat and has now adopted a vegetarian diet.  He is hopeful this will help him drop some weight.  He does not exercise due to hip discomfort.  He had his left hip replaced and will likely need a right hip replacement in the coming years.  No tobacco use.  Consumes 1 glass of wine 3 times per week.        Social History      Social History     Socioeconomic History    Marital status:      Spouse name: Not on file    Number of children: Not on file    Years of education: Not on file    Highest education level: Not on file   Occupational History    Not on file   Tobacco Use    Smoking status: Former     Types: Cigarettes     Quit date: 12/12/2012     Years since quitting: 10.3    Smokeless tobacco: Former    Tobacco comments:     Vaping currently   Vaping Use    Vaping status: Not on file   Substance and Sexual Activity    Alcohol use: Yes     Alcohol/week: 0.0 standard drinks of alcohol     Comment: occasionally    Drug use: No    Sexual activity:  "Yes     Partners: Female   Other Topics Concern    Parent/sibling w/ CABG, MI or angioplasty before 65F 55M? Not Asked     Service Not Asked    Blood Transfusions Not Asked    Caffeine Concern No     Comment: 2-3 cups per day    Occupational Exposure Not Asked    Hobby Hazards Not Asked    Sleep Concern Not Asked    Stress Concern No    Weight Concern Not Asked    Special Diet Yes     Comment: trying to eat healthier - lower carbs    Back Care Not Asked    Exercise Yes     Comment: walking-  3-4 times per week    Bike Helmet Not Asked    Seat Belt Not Asked    Self-Exams Not Asked   Social History Narrative    Not on file     Social Determinants of Health     Financial Resource Strain: Not on file   Food Insecurity: Not on file   Transportation Needs: Not on file   Physical Activity: Not on file   Stress: Not on file   Social Connections: Not on file   Intimate Partner Violence: Not on file   Housing Stability: Not on file            Review of Systems:   Please see HPI         Physical Exam:   Vitals: /73 (BP Location: Right arm, Patient Position: Sitting, Cuff Size: Adult Regular)   Pulse 60   Ht 1.727 m (5' 8\")   Wt 105.1 kg (231 lb 9.6 oz)   SpO2 93%   BMI 35.21 kg/m     Wt Readings from Last 4 Encounters:   05/12/22 105.9 kg (233 lb 8 oz)   04/08/21 103.4 kg (228 lb)   04/15/20 98 kg (216 lb)   03/01/19 99.4 kg (219 lb 3.2 oz)     GEN: well nourished, in no acute distress.  HEENT:  Pupils equal, round. Sclerae nonicteric.   NECK: Supple, no masses appreciated.  Unable to assess JVP secondary to body habitus  C/V:  Regular rate and rhythm, no murmur, rub or gallop.    RESP: Respirations are unlabored. Clear to auscultation bilaterally without wheezing, rales, or rhonchi.  GI: Abdomen soft, nontender.  EXTREM: Trace bilateral LE edema.  Bilateral hyperpigmentation lower extremities.  NEURO: Alert and oriented, cooperative.  SKIN: Warm and dry.       Data:   LIPID RESULTS:  Lab Results "   Component Value Date    CHOL 142 05/12/2022    CHOL 124 04/08/2021    HDL 38 (L) 05/12/2022    HDL 39 (L) 04/08/2021    LDL 72 05/12/2022    LDL 55 04/08/2021    TRIG 160 (H) 05/12/2022    TRIG 149 04/08/2021    CHOLHDLRATIO 3.9 10/29/2015     LIVER ENZYME RESULTS:  Lab Results   Component Value Date    ALT 19 05/12/2022    ALT 22 04/09/2020     CBC RESULTS:  Lab Results   Component Value Date    WBC 5.5 07/02/2012    RBC 4.51 07/02/2012    HGB 14.8 07/02/2012    HCT 43.3 07/02/2012    MCV 96 07/02/2012    MCH 32.8 07/02/2012    MCHC 34.2 07/02/2012    RDW 13.0 07/02/2012     07/02/2012     BMP RESULTS:  Lab Results   Component Value Date     05/12/2022     04/08/2021    POTASSIUM 4.1 05/12/2022    POTASSIUM 4.2 04/08/2021    CHLORIDE 108 05/12/2022    CHLORIDE 107 04/08/2021    CO2 27 05/12/2022    CO2 29 04/08/2021    ANIONGAP 3 05/12/2022    ANIONGAP 2 (L) 04/08/2021     (H) 05/12/2022     (H) 04/08/2021    BUN 10 05/12/2022    BUN 7 04/08/2021    CR 0.84 05/12/2022    CR 0.88 04/08/2021    GFRESTIMATED 89 05/12/2022    GFRESTIMATED 83 04/08/2021    GFRESTBLACK >90 04/08/2021    JENNIFER 8.7 05/12/2022    JENNIFER 8.6 04/08/2021      A1C RESULTS:  No results found for: A1C  INR RESULTS:  Lab Results   Component Value Date    INR 1.03 07/02/2012    INR 1.03 06/11/2012            Medications     Current Outpatient Medications   Medication Sig Dispense Refill    ampicillin (PRINCIPEN) 500 MG capsule Take 500 mg by mouth 4 tablets prior to dental appointments      aspirin EC 81 MG EC tablet Take 1 tablet by mouth daily. 90 tablet 3    atorvastatin (LIPITOR) 40 MG tablet Take 1 tablet (40 mg) by mouth daily 90 tablet 3    carvedilol (COREG) 6.25 MG tablet Take 1 tablet (6.25 mg) by mouth 2 times daily 180 tablet 3    lisinopril (ZESTRIL) 2.5 MG tablet Take 1 tablet (2.5 mg) by mouth daily 90 tablet 3    nitroglycerin (NITROSTAT) 0.4 MG SL tablet Place 1 tablet (0.4 mg) under the tongue every 5  minutes as needed for chest pain 25 tablet 3    tamsulosin (FLOMAX) 0.4 MG capsule Take 0.4 mg by mouth daily            Past Medical History     Past Medical History:   Diagnosis Date    AMI anterior wall (H) 6/12/2012    CAD (coronary artery disease) 6/12/2012 6/2012 - Anterior MI with Thrombectomy, PCI and JULIA to LAD 7/2012 - PCI and JULIA to LCx     Cardiomyopathy, ischemic     Coronary artery disease     MI 6/12/12 with thrombectomy and drug eluding STENT to LAD    Hydrocele     Hyperlipidaemia     Hyperlipidemia      Diagnosis updated by automated process. Provider to review and confirm.    Myocardial infarction (H) 6/2012    Anterior    S/P primary angioplasty with coronary stent 6/12/2012     Past Surgical History:   Procedure Laterality Date    HEART CATH, ANGIOPLASTY  6/2012    JULIA stenting to LAD    HEART CATH, ANGIOPLASTY  7/2012    taged cath JULIA stenting to CFX    HERNIA REPAIR      ORTHOPEDIC SURGERY      Left hip replacement     Family History   Problem Relation Age of Onset    Hypertension Father     Prostate Cancer Father     Ovarian Cancer Mother             Allergies   Clindamycin hcl, Hydrocodone-acetaminophen, and Warfarin      40 minutes spent on the date of the encounter doing chart review, history and exam, documentation and further activities as noted above    XIOMARA Tolbert [facility] - [facility] Care  Pager: [phone]        Thank you for allowing me to participate in the care of your patient.      Sincerely,     XIOMARA Tolbert [facility]  cc:   Dakota Yee MD  [address] [place] [id number]

## 2023-05-01 NOTE — PROGRESS NOTES
Cardiology Clinic Progress Note  Lion Chaudhari MRN# [id number]   YOB: 1945 Age: 77 year old   Primary Cardiologist: Dr. Yee Reason for visit: Annual follow-up            Assessment and Plan:   Lion Chaudhari is a very pleasant 77 year old male who is here today for annual follow-up.    Coronary artery disease  -Acute MI of anterior wall 6/2012 s/p emergent thrombectomy and JULIA to mLAD  -s/p staged PCI with JULIA to Cx 7/2012  -On ASA 81 mg daily, atorvastatin, carvedilol, lisinopril  Ischemic cardiomyopathy  - LVEF: 40-45% on echo 4/6/2023 with apical akinesis, distal inferior and distal inferoseptal akinesis, distal anteroseptal and distal inferolateral akinesis (unchanged from prior echo 2013)  - Fluid status: Euvolemic  - Diuretic regimen: None  - Guideline directed medical therapy:  - Beta blocker:  Carvedilol 6.25 mg twice daily  - ACEI/ARB/ARNI: Lisinopril 2.5 mg daily  - Aldactone antagonist:  None  - SGLT2 inhibitor: None  -Counseled patient on fluid and sodium restriction  Hyperlipidemia  -LDL 72 5/2022  -On atorvastatin 40 mg daily; has declined increase in dose  -Repeat lipid panel/ALT today      Changes today: None    Follow up plan:  BMP and lipid panel/ALT today (patient is fasting).  We will contact him with the results once they become available.  Follow-up with Dr. Yee in 1 year, sooner if needed    Alysia Moon PA-C  [facility] - Heart Care  Pager: [phone]          History of Presenting Illness:    Lion Chaudhari is a very pleasant 77 year old male with a history of coronary artery disease suffering an acute MI of anterior wall 6/2012 status post emergent thrombectomy and JULIA to mid LAD, status post staged PCI with JULIA to circumflex 7/2012, ischemic cardiomyopathy with LVEF 40 to 45%, and hyperlipidemia.    Patient has continued to do well since 2012.  Cardiac status has remained stable on current doses of carvedilol, lisinopril, aspirin, and atorvastatin.       Echocardiogram was completed 4/6/2023 revealing LVEF 40 to 45%, apical akinesis, distal inferior and distal inferoseptal akinesis, distal anteroseptal and distal inferolateral akinesis.  Unchanged from prior echocardiogram completed in 2013.    Patient is here today for annual follow-up.  He has been doing very well over the past year.  Denies episodes of chest pain, lightheadedness, dizziness, orthopnea, PND, near-syncope and syncope.  He continues to have dyspnea with exertion but reports this is chronic and has been stable for several years.  He takes all of his medications daily as prescribed.    Blood pressure 119/73 and heart rate 60 in clinic today.  He is recently made drastic changes to his diet.  Used to eat lots of red meat and has now adopted a vegetarian diet.  He is hopeful this will help him drop some weight.  He does not exercise due to hip discomfort.  He had his left hip replaced and will likely need a right hip replacement in the coming years.  No tobacco use.  Consumes 1 glass of wine 3 times per week.        Social History      Social History     Socioeconomic History     Marital status:      Spouse name: Not on file     Number of children: Not on file     Years of education: Not on file     Highest education level: Not on file   Occupational History     Not on file   Tobacco Use     Smoking status: Former     Types: Cigarettes     Quit date: 12/12/2012     Years since quitting: 10.3     Smokeless tobacco: Former     Tobacco comments:     Vaping currently   Vaping Use     Vaping status: Not on file   Substance and Sexual Activity     Alcohol use: Yes     Alcohol/week: 0.0 standard drinks of alcohol     Comment: occasionally     Drug use: No     Sexual activity: Yes     Partners: Female   Other Topics Concern     Parent/sibling w/ CABG, MI or angioplasty before 65F 55M? Not Asked      Service Not Asked     Blood Transfusions Not Asked     Caffeine Concern No     Comment: 2-3  "cups per day     Occupational Exposure Not Asked     Hobby Hazards Not Asked     Sleep Concern Not Asked     Stress Concern No     Weight Concern Not Asked     Special Diet Yes     Comment: trying to eat healthier - lower carbs     Back Care Not Asked     Exercise Yes     Comment: walking-  3-4 times per week     Bike Helmet Not Asked     Seat Belt Not Asked     Self-Exams Not Asked   Social History Narrative     Not on file     Social Determinants of Health     Financial Resource Strain: Not on file   Food Insecurity: Not on file   Transportation Needs: Not on file   Physical Activity: Not on file   Stress: Not on file   Social Connections: Not on file   Intimate Partner Violence: Not on file   Housing Stability: Not on file            Review of Systems:   Please see HPI         Physical Exam:   Vitals: /73 (BP Location: Right arm, Patient Position: Sitting, Cuff Size: Adult Regular)   Pulse 60   Ht 1.727 m (5' 8\")   Wt 105.1 kg (231 lb 9.6 oz)   SpO2 93%   BMI 35.21 kg/m     Wt Readings from Last 4 Encounters:   05/12/22 105.9 kg (233 lb 8 oz)   04/08/21 103.4 kg (228 lb)   04/15/20 98 kg (216 lb)   03/01/19 99.4 kg (219 lb 3.2 oz)     GEN: well nourished, in no acute distress.  HEENT:  Pupils equal, round. Sclerae nonicteric.   NECK: Supple, no masses appreciated.  Unable to assess JVP secondary to body habitus  C/V:  Regular rate and rhythm, no murmur, rub or gallop.    RESP: Respirations are unlabored. Clear to auscultation bilaterally without wheezing, rales, or rhonchi.  GI: Abdomen soft, nontender.  EXTREM: Trace bilateral LE edema.  Bilateral hyperpigmentation lower extremities.  NEURO: Alert and oriented, cooperative.  SKIN: Warm and dry.       Data:   LIPID RESULTS:  Lab Results   Component Value Date    CHOL 142 05/12/2022    CHOL 124 04/08/2021    HDL 38 (L) 05/12/2022    HDL 39 (L) 04/08/2021    LDL 72 05/12/2022    LDL 55 04/08/2021    TRIG 160 (H) 05/12/2022    TRIG 149 04/08/2021    " CHOLHDLRATIO 3.9 10/29/2015     LIVER ENZYME RESULTS:  Lab Results   Component Value Date    ALT 19 05/12/2022    ALT 22 04/09/2020     CBC RESULTS:  Lab Results   Component Value Date    WBC 5.5 07/02/2012    RBC 4.51 07/02/2012    HGB 14.8 07/02/2012    HCT 43.3 07/02/2012    MCV 96 07/02/2012    MCH 32.8 07/02/2012    MCHC 34.2 07/02/2012    RDW 13.0 07/02/2012     07/02/2012     BMP RESULTS:  Lab Results   Component Value Date     05/12/2022     04/08/2021    POTASSIUM 4.1 05/12/2022    POTASSIUM 4.2 04/08/2021    CHLORIDE 108 05/12/2022    CHLORIDE 107 04/08/2021    CO2 27 05/12/2022    CO2 29 04/08/2021    ANIONGAP 3 05/12/2022    ANIONGAP 2 (L) 04/08/2021     (H) 05/12/2022     (H) 04/08/2021    BUN 10 05/12/2022    BUN 7 04/08/2021    CR 0.84 05/12/2022    CR 0.88 04/08/2021    GFRESTIMATED 89 05/12/2022    GFRESTIMATED 83 04/08/2021    GFRESTBLACK >90 04/08/2021    JENNIFER 8.7 05/12/2022    JENNIFER 8.6 04/08/2021      A1C RESULTS:  No results found for: A1C  INR RESULTS:  Lab Results   Component Value Date    INR 1.03 07/02/2012    INR 1.03 06/11/2012            Medications     Current Outpatient Medications   Medication Sig Dispense Refill     ampicillin (PRINCIPEN) 500 MG capsule Take 500 mg by mouth 4 tablets prior to dental appointments       aspirin EC 81 MG EC tablet Take 1 tablet by mouth daily. 90 tablet 3     atorvastatin (LIPITOR) 40 MG tablet Take 1 tablet (40 mg) by mouth daily 90 tablet 3     carvedilol (COREG) 6.25 MG tablet Take 1 tablet (6.25 mg) by mouth 2 times daily 180 tablet 3     lisinopril (ZESTRIL) 2.5 MG tablet Take 1 tablet (2.5 mg) by mouth daily 90 tablet 3     nitroglycerin (NITROSTAT) 0.4 MG SL tablet Place 1 tablet (0.4 mg) under the tongue every 5 minutes as needed for chest pain 25 tablet 3     tamsulosin (FLOMAX) 0.4 MG capsule Take 0.4 mg by mouth daily            Past Medical History     Past Medical History:   Diagnosis Date     AMI anterior wall  (H) 6/12/2012     CAD (coronary artery disease) 6/12/2012 6/2012 - Anterior MI with Thrombectomy, PCI and JULIA to LAD 7/2012 - PCI and JULIA to LCx      Cardiomyopathy, ischemic      Coronary artery disease     MI 6/12/12 with thrombectomy and drug eluding STENT to LAD     Hydrocele      Hyperlipidaemia      Hyperlipidemia      Diagnosis updated by automated process. Provider to review and confirm.     Myocardial infarction (H) 6/2012    Anterior     S/P primary angioplasty with coronary stent 6/12/2012     Past Surgical History:   Procedure Laterality Date     HEART CATH, ANGIOPLASTY  6/2012    JULIA stenting to LAD     HEART CATH, ANGIOPLASTY  7/2012    taged cath JULIA stenting to CFX     HERNIA REPAIR       ORTHOPEDIC SURGERY      Left hip replacement     Family History   Problem Relation Age of Onset     Hypertension Father      Prostate Cancer Father      Ovarian Cancer Mother             Allergies   Clindamycin hcl, Hydrocodone-acetaminophen, and Warfarin      40 minutes spent on the date of the encounter doing chart review, history and exam, documentation and further activities as noted above    Alysia Moon PA-C  [facility] - Heart Care  Pager: [phone]

## 2023-05-03 ENCOUNTER — CARE COORDINATION (OUTPATIENT)
Dept: CARDIOLOGY | Facility: CLINIC | Age: 78
End: 2023-05-03
Payer: COMMERCIAL

## 2023-05-03 DIAGNOSIS — E78.00 PURE HYPERCHOLESTEROLEMIA: Primary | ICD-10-CM

## 2023-05-03 DIAGNOSIS — I25.10 CORONARY ARTERY DISEASE INVOLVING NATIVE CORONARY ARTERY OF NATIVE HEART WITHOUT ANGINA PECTORIS: ICD-10-CM

## 2023-05-03 NOTE — PROGRESS NOTES
Alysia Moon PA-C [facility] Heart Team 7 9 minutes ago (1:59 PM)     HS  Thanks for the update!

## 2023-05-03 NOTE — PROGRESS NOTES
Call out to pt w/recent lab results and recommendations per ERAN Hatfield. Pt states he doesn't want to change his statin at this time. Pt states he was eating poorly and hasn't exercised. Now that the weather is nice he said he's going to be eating less red meat and increase his physical activity with daily walks and lawn care. Pt said he wants to see how his lipids respond to lifestyle modifications first before he considers escalating statin therapy. He is willing to recheck lipids in 3 months to monitor that LDL is coming down. Lab orders entered for ~ 3 months. Update to Alysia. Bernice Aldana RN on 5/3/2023 at 1:54 PM      Result Care Coordination      Result Notes     Alysia Moon PA-C   5/2/2023  7:40 AM CDT Back to Top      BMP, lipid panel, and ALT reviewed.      Kidney function and electrolytes remain normal.  Liver function is normal.  Cholesterol is not quite at goal given patient's history of MI.  If he is willing, I recommend he stop atorvastatin and start rosuvastatin 40 mg once daily with repeat lipid panel/ALT in 2 to 3 months.     Alysia Moon PA-C on 5/2/2023 at 7:40 AM     Component      Latest Ref Rng 5/1/2023  11:02 AM   Sodium      136 - 145 mmol/L 137    Potassium      3.4 - 5.3 mmol/L 4.1    Chloride      98 - 107 mmol/L 103    Carbon Dioxide (CO2)      22 - 29 mmol/L 26    Anion Gap      7 - 15 mmol/L 8    Urea Nitrogen      8.0 - 23.0 mg/dL 10.3    Creatinine      0.67 - 1.17 mg/dL 0.88    Calcium      8.8 - 10.2 mg/dL 9.1    Glucose      70 - 99 mg/dL 104 (H)    GFR Estimate      >60 mL/min/1.73m2 87       Legend:  (H) High      Component      Latest Ref Rng 5/1/2023  11:02 AM   Cholesterol      <200 mg/dL 152    Triglycerides      <150 mg/dL 181 (H)    HDL Cholesterol      >=40 mg/dL 38 (L)    LDL Cholesterol Calculated      <=100 mg/dL 78    Non HDL Cholesterol      <130 mg/dL 114    ALT      10 - 50 U/L 17       Legend:  (H) High  (L) Low

## 2023-06-11 ENCOUNTER — HEALTH MAINTENANCE LETTER (OUTPATIENT)
Age: 78
End: 2023-06-11

## 2023-07-10 DIAGNOSIS — I25.10 CORONARY ARTERY DISEASE INVOLVING NATIVE HEART WITHOUT ANGINA PECTORIS, UNSPECIFIED VESSEL OR LESION TYPE: ICD-10-CM

## 2023-07-10 DIAGNOSIS — I21.09 AMI ANTERIOR WALL (H): ICD-10-CM

## 2023-07-10 RX ORDER — ATORVASTATIN CALCIUM 40 MG/1
40 TABLET, FILM COATED ORAL DAILY
Qty: 90 TABLET | Refills: 0 | Status: SHIPPED | OUTPATIENT
Start: 2023-07-10 | End: 2023-10-04

## 2023-07-10 RX ORDER — LISINOPRIL 2.5 MG/1
2.5 TABLET ORAL DAILY
Qty: 90 TABLET | Refills: 3 | Status: SHIPPED | OUTPATIENT
Start: 2023-07-10 | End: 2024-05-14

## 2023-07-10 RX ORDER — CARVEDILOL 6.25 MG/1
6.25 TABLET ORAL 2 TIMES DAILY
Qty: 180 TABLET | Refills: 3 | Status: SHIPPED | OUTPATIENT
Start: 2023-07-10 | End: 2024-05-14

## 2023-10-04 DIAGNOSIS — I25.10 CORONARY ARTERY DISEASE INVOLVING NATIVE HEART WITHOUT ANGINA PECTORIS, UNSPECIFIED VESSEL OR LESION TYPE: ICD-10-CM

## 2023-10-04 RX ORDER — ATORVASTATIN CALCIUM 40 MG/1
40 TABLET, FILM COATED ORAL DAILY
Qty: 90 TABLET | Refills: 0 | Status: SHIPPED | OUTPATIENT
Start: 2023-10-04 | End: 2024-01-09

## 2023-11-08 ENCOUNTER — LAB (OUTPATIENT)
Dept: LAB | Facility: CLINIC | Age: 78
End: 2023-11-08
Payer: COMMERCIAL

## 2023-11-08 DIAGNOSIS — E78.00 PURE HYPERCHOLESTEROLEMIA: ICD-10-CM

## 2023-11-08 DIAGNOSIS — I25.10 CORONARY ARTERY DISEASE INVOLVING NATIVE CORONARY ARTERY OF NATIVE HEART WITHOUT ANGINA PECTORIS: ICD-10-CM

## 2023-11-08 LAB
ALT SERPL W P-5'-P-CCNC: 18 U/L (ref 0–70)
CHOLEST SERPL-MCNC: 134 MG/DL
HDLC SERPL-MCNC: 39 MG/DL
LDLC SERPL CALC-MCNC: 68 MG/DL
NONHDLC SERPL-MCNC: 95 MG/DL
TRIGL SERPL-MCNC: 135 MG/DL

## 2023-11-08 PROCEDURE — 36415 COLL VENOUS BLD VENIPUNCTURE: CPT | Performed by: INTERNAL MEDICINE

## 2023-11-08 PROCEDURE — 80061 LIPID PANEL: CPT | Performed by: INTERNAL MEDICINE

## 2023-11-08 PROCEDURE — 84460 ALANINE AMINO (ALT) (SGPT): CPT | Performed by: INTERNAL MEDICINE

## 2024-01-03 DIAGNOSIS — I25.10 CORONARY ARTERY DISEASE INVOLVING NATIVE HEART WITHOUT ANGINA PECTORIS, UNSPECIFIED VESSEL OR LESION TYPE: ICD-10-CM

## 2024-01-09 DIAGNOSIS — I25.10 CORONARY ARTERY DISEASE INVOLVING NATIVE HEART WITHOUT ANGINA PECTORIS, UNSPECIFIED VESSEL OR LESION TYPE: ICD-10-CM

## 2024-01-09 RX ORDER — ATORVASTATIN CALCIUM 40 MG/1
40 TABLET, FILM COATED ORAL DAILY
Qty: 90 TABLET | Refills: 1 | Status: SHIPPED | OUTPATIENT
Start: 2024-01-09 | End: 2024-05-14

## 2024-01-09 RX ORDER — ATORVASTATIN CALCIUM 40 MG/1
40 TABLET, FILM COATED ORAL DAILY
Qty: 90 TABLET | Refills: 0 | OUTPATIENT
Start: 2024-01-09

## 2024-05-14 ENCOUNTER — OFFICE VISIT (OUTPATIENT)
Dept: CARDIOLOGY | Facility: CLINIC | Age: 79
End: 2024-05-14
Payer: COMMERCIAL

## 2024-05-14 VITALS
WEIGHT: 232.9 LBS | SYSTOLIC BLOOD PRESSURE: 118 MMHG | OXYGEN SATURATION: 93 % | BODY MASS INDEX: 35.3 KG/M2 | HEIGHT: 68 IN | HEART RATE: 50 BPM | DIASTOLIC BLOOD PRESSURE: 62 MMHG

## 2024-05-14 DIAGNOSIS — I25.10 CORONARY ARTERY DISEASE INVOLVING NATIVE CORONARY ARTERY OF NATIVE HEART WITHOUT ANGINA PECTORIS: ICD-10-CM

## 2024-05-14 DIAGNOSIS — I25.5 CARDIOMYOPATHY, ISCHEMIC: Primary | ICD-10-CM

## 2024-05-14 DIAGNOSIS — I25.10 CORONARY ARTERY DISEASE INVOLVING NATIVE HEART WITHOUT ANGINA PECTORIS, UNSPECIFIED VESSEL OR LESION TYPE: ICD-10-CM

## 2024-05-14 DIAGNOSIS — I21.09 AMI ANTERIOR WALL (H): ICD-10-CM

## 2024-05-14 DIAGNOSIS — E78.00 PURE HYPERCHOLESTEROLEMIA: ICD-10-CM

## 2024-05-14 PROCEDURE — 99214 OFFICE O/P EST MOD 30 MIN: CPT | Performed by: INTERNAL MEDICINE

## 2024-05-14 RX ORDER — ATORVASTATIN CALCIUM 40 MG/1
40 TABLET, FILM COATED ORAL DAILY
Qty: 90 TABLET | Refills: 3 | Status: SHIPPED | OUTPATIENT
Start: 2024-05-14

## 2024-05-14 RX ORDER — CARVEDILOL 6.25 MG/1
6.25 TABLET ORAL 2 TIMES DAILY
Qty: 180 TABLET | Refills: 3 | Status: SHIPPED | OUTPATIENT
Start: 2024-05-14

## 2024-05-14 RX ORDER — LISINOPRIL 2.5 MG/1
2.5 TABLET ORAL DAILY
Qty: 90 TABLET | Refills: 3 | Status: SHIPPED | OUTPATIENT
Start: 2024-05-14

## 2024-05-14 NOTE — PROGRESS NOTES
HISTORY:    Lion Chaudhari is a pleasant 78-year-old gentleman with a history of coronary artery disease.  In June 2012 he offered an MI requiring a stent in his LAD with a staged stent in his circumflex.  From that his event he was left with an ischemic cardiomyopathy with his current ejection fraction enterally in the 40 to 45% range with evidence for previous anterior MI.  Other medical problems include obesity, hypertension, and hyperlipidemia.  He is here today for routine follow-up visit.    Today Lion reports that overall he is doing well.  He has been mowing his lawn without difficulty.  He denies exertional chest arm neck shoulder or jaw discomfort and recalls the discomfort he experienced at the time of his MI.  He reports that he has not had a recurrence of that pain even to a mild degree.  He also denies symptoms of palpitations, PND/orthopnea, syncope or near syncope, strokelike symptoms, significant peripheral edema, or symptoms of claudication.  Overall he feels that his energy and stamina are normal.    Lion checks his blood pressure periodically at home and generally finds it to be in the 130 range.  Our readings in clinic have generally been lower but are in the 120 range.  His exam today is essentially normal.      ASSESSMENT/PLAN:    1.  History of coronary artery disease.  No recurrence of angina and the patient remains physically active.  No surveillance stress testing is needed but I reminded him of the importance of him letting us know if he begins developing angina, even to a mild degree.  2.  Ischemic cardiomyopathy.  [facility]  His heart rate today is just 50 so we are limited in our dosage of carvedilol.  His blood pressure would tolerate higher dose of lisinopril and I suggest that we do this but he declined to increase the dose.  We also talked about the possibility of spironolactone, which would be my last choice of meds to add.  I spoke extensively about the  possibility of him starting Jardiance.  He is not even remotely interested.  He would prefer not to take any more medications and he does not have insurance for drug coverage.  Fortunately, he has remained stable and asymptomatic for more than a decade.  3.  Hyperlipidemia.  Lipid profile is adequately controlled.  In the past we have suggested going up on Lipitor but he has declined that as well.  4.  History of hypertension.  Low blood pressure has actually been an issue with his medications but his blood pressure has drifted higher in recent years.  Not worried about his blood pressure being too high, but he certainly should tolerate a higher dose of his lisinopril.  Mentioned above, he declined.    Thank you for inviting me to participate in the care of your patient.  Please don't hesitate to call if I can be of further assistance.  35 minutes were spent today reviewing the chart and other records, interviewing and examining the patient, and documenting our visit.    Chart documentation was completed, in part, with [employer] voice-recognition software. Even though reviewed, some grammatical, spelling, and word errors may remain.       Orders Placed This Encounter   Procedures    Follow-Up with Cardiologist     Orders Placed This Encounter   Medications    atorvastatin (LIPITOR) 40 MG tablet     Sig: Take 1 tablet (40 mg) by mouth daily     Dispense:  90 tablet     Refill:  3    carvedilol (COREG) 6.25 MG tablet     Sig: Take 1 tablet (6.25 mg) by mouth 2 times daily     Dispense:  180 tablet     Refill:  3    lisinopril (ZESTRIL) 2.5 MG tablet     Sig: Take 1 tablet (2.5 mg) by mouth daily     Dispense:  90 tablet     Refill:  3     Medications Discontinued During This Encounter   Medication Reason    lisinopril (ZESTRIL) 2.5 MG tablet Reorder (No AVS)    carvedilol (COREG) 6.25 MG tablet Reorder (No AVS)    atorvastatin (LIPITOR) 40 MG tablet Reorder (No AVS)       10 year ASCVD risk: The ASCVD Risk score (Yaquelin  DARREN, et al., 2019) failed to calculate for the following reasons:    The 2019 ASCVD risk score is only valid for ages 40 to 79    The patient has a prior MI or stroke diagnosis    Encounter Diagnoses   Name Primary?    Coronary artery disease involving native coronary artery of native heart without angina pectoris     Pure hypercholesterolemia     Coronary artery disease involving native heart without angina pectoris, unspecified vessel or lesion type     AMI anterior wall (H)     Cardiomyopathy, ischemic Yes       CURRENT MEDICATIONS:  Current Outpatient Medications   Medication Sig Dispense Refill    ampicillin (PRINCIPEN) 500 MG capsule Take 500 mg by mouth 4 tablets prior to dental appointments      aspirin EC 81 MG EC tablet Take 1 tablet by mouth daily. 90 tablet 3    atorvastatin (LIPITOR) 40 MG tablet Take 1 tablet (40 mg) by mouth daily 90 tablet 3    carvedilol (COREG) 6.25 MG tablet Take 1 tablet (6.25 mg) by mouth 2 times daily 180 tablet 3    lisinopril (ZESTRIL) 2.5 MG tablet Take 1 tablet (2.5 mg) by mouth daily 90 tablet 3    nitroglycerin (NITROSTAT) 0.4 MG SL tablet Place 1 tablet (0.4 mg) under the tongue every 5 minutes as needed for chest pain 25 tablet 3    tamsulosin (FLOMAX) 0.4 MG capsule Take 0.4 mg by mouth daily         ALLERGIES     Allergies   Allergen Reactions    Clindamycin Hcl Rash    Hydrocodone-Acetaminophen Itching    Warfarin Rash       PAST MEDICAL HISTORY:  Past Medical History:   Diagnosis Date    AMI anterior wall (H) 6/12/2012    CAD (coronary artery disease) 6/12/2012 6/2012 - Anterior MI with Thrombectomy, PCI and JULIA to LAD 7/2012 - PCI and JULIA to LCx     Cardiomyopathy, ischemic     Coronary artery disease     MI 6/12/12 with thrombectomy and drug eluding STENT to LAD    Hydrocele     Hyperlipidaemia     Hyperlipidemia      Diagnosis updated by automated process. Provider to review and confirm.    Myocardial infarction (H) 6/2012    Anterior    S/P primary angioplasty  "with coronary stent 2012       PAST SURGICAL HISTORY:  Past Surgical History:   Procedure Laterality Date    HEART CATH, ANGIOPLASTY  2012    JULIA stenting to LAD    HEART CATH, ANGIOPLASTY  2012    taged cath JULIA stenting to CFX    HERNIA REPAIR      ORTHOPEDIC SURGERY      Left hip replacement       FAMILY HISTORY:  Family History   Problem Relation Age of Onset    Hypertension Father     Prostate Cancer Father     Ovarian Cancer Mother        SOCIAL HISTORY:  Social History     Socioeconomic History    Marital status:      Spouse name: None    Number of children: None    Years of education: None    Highest education level: None   Tobacco Use    Smoking status: Former     Current packs/day: 0.00     Types: Cigarettes     Quit date: 2012     Years since quittin.4    Smokeless tobacco: Former    Tobacco comments:     Vaping currently   Substance and Sexual Activity    Alcohol use: Yes     Alcohol/week: 0.0 standard drinks of alcohol     Comment: occasionally    Drug use: No    Sexual activity: Yes     Partners: Female   Other Topics Concern    Caffeine Concern No     Comment: 2-3 cups per day    Stress Concern No    Special Diet Yes     Comment: trying to eat healthier - lower carbs    Exercise Yes     Comment: walking-  3-4 times per week     Social Determinants of Health      Received from [facility]    Social Connections       Review of Systems:  Skin:  not assessed     Eyes:  not assessed    ENT:  not assessed    Respiratory:  Negative    Cardiovascular:  Negative    Gastroenterology: not assessed    Genitourinary:  not assessed    Musculoskeletal:  not assessed    Neurologic:  not assessed    Psychiatric:  not assessed    Heme/Lymph/Imm:  not assessed    Endocrine:  not assessed      Physical Exam:  Vitals: /62 (BP Location: Right arm, Patient Position: Sitting, Cuff Size: Adult Large)   Pulse 50   Ht 1.727 m (5' 8\")   Wt 105.6 kg (232 lb 14.4 " oz)   SpO2 93%   BMI 35.41 kg/m      Constitutional:  cooperative, alert and oriented, well developed, well nourished, in no acute distress obese      Skin:  warm and dry to the touch, no apparent skin lesions or masses noted        Head:  normocephalic, no masses or lesions        Eyes:  pupils equal and round, conjunctivae and lids unremarkable, sclera white, no xanthalasma, EOMS intact, no nystagmus        ENT:  no pallor or cyanosis, dentition good        Neck:  carotid pulses are full and equal bilaterally, JVP normal, no carotid bruit        Chest:  normal breath sounds, clear to auscultation, normal A-P diameter, normal symmetry, normal respiratory excursion, no use of accessory muscles        Cardiac: regular rhythm, normal S1/S2, no S3 or S4, apical impulse not displaced, no murmurs, gallops or rubs                  Abdomen:  abdomen soft;BS normoactive obese      Vascular: pulses full and equal                                      Extremities and Muscular Skeletal:        bilateral LE edema;trace     Neurological:  no gross motor deficits        Psych:  affect appropriate, oriented to time, person and place     Recent Lab Results:  LIPID RESULTS:  Lab Results   Component Value Date    CHOL 134 11/08/2023    CHOL 124 04/08/2021    HDL 39 (L) 11/08/2023    HDL 39 (L) 04/08/2021    LDL 68 11/08/2023    LDL 55 04/08/2021    TRIG 135 11/08/2023    TRIG 149 04/08/2021    CHOLHDLRATIO 3.9 10/29/2015       LIVER ENZYME RESULTS:  Lab Results   Component Value Date    ALT 18 11/08/2023    ALT 22 04/09/2020       CBC RESULTS:  Lab Results   Component Value Date    WBC 5.5 07/02/2012    RBC 4.51 07/02/2012    HGB 14.8 07/02/2012    HCT 43.3 07/02/2012    MCV 96 07/02/2012    MCH 32.8 07/02/2012    MCHC 34.2 07/02/2012    RDW 13.0 07/02/2012     07/02/2012       BMP RESULTS:  Lab Results   Component Value Date     05/01/2023     04/08/2021    POTASSIUM 4.1 05/01/2023    POTASSIUM 4.1 05/12/2022     "POTASSIUM 4.2 04/08/2021    CHLORIDE 103 05/01/2023    CHLORIDE 108 05/12/2022    CHLORIDE 107 04/08/2021    CO2 26 05/01/2023    CO2 27 05/12/2022    CO2 29 04/08/2021    ANIONGAP 8 05/01/2023    ANIONGAP 3 05/12/2022    ANIONGAP 2 (L) 04/08/2021     (H) 05/01/2023     (H) 05/12/2022     (H) 04/08/2021    BUN 10.3 05/01/2023    BUN 10 05/12/2022    BUN 7 04/08/2021    CR 0.88 05/01/2023    CR 0.88 04/08/2021    GFRESTIMATED 87 05/01/2023    GFRESTIMATED 83 04/08/2021    GFRESTBLACK >90 04/08/2021    JENNIFER 9.1 05/01/2023    JENNIFER 8.6 04/08/2021        A1C RESULTS:  No results found for: \"A1C\"    INR RESULTS:  Lab Results   Component Value Date    INR 1.03 07/02/2012    INR 1.03 06/11/2012         Dakota Yee MD, FACC    CC  Alysia Moon PA-C  [address] [place] [address]                  "

## 2024-05-14 NOTE — LETTER
5/14/2024    Jarett Jose MD  [address] [place] [address]    RE: Lion Chaudhari       Dear Colleague,     I had the pleasure of seeing Lion Chaudhari in the [facility].  HISTORY:    Lion Chaudhari is a pleasant 78-year-old gentleman with a history of coronary artery disease.  In June 2012 he offered an MI requiring a stent in his LAD with a staged stent in his circumflex.  From that his event he was left with an ischemic cardiomyopathy with his current ejection fraction enterally in the 40 to 45% range with evidence for previous anterior MI.  Other medical problems include obesity, hypertension, and hyperlipidemia.  He is here today for routine follow-up visit.    Today Lion reports that overall he is doing well.  He has been mowing his lawn without difficulty.  He denies exertional chest arm neck shoulder or jaw discomfort and recalls the discomfort he experienced at the time of his MI.  He reports that he has not had a recurrence of that pain even to a mild degree.  He also denies symptoms of palpitations, PND/orthopnea, syncope or near syncope, strokelike symptoms, significant peripheral edema, or symptoms of claudication.  Overall he feels that his energy and stamina are normal.    Lion checks his blood pressure periodically at home and generally finds it to be in the 130 range.  Our readings in clinic have generally been lower but are in the 120 range.  His exam today is essentially normal.      ASSESSMENT/PLAN:    1.  History of coronary artery disease.  No recurrence of angina and the patient remains physically active.  No surveillance stress testing is needed but I reminded him of the importance of him letting us know if he begins developing angina, even to a mild degree.  2.  Ischemic cardiomyopathy.  The patient is not yet at [facility].  His heart rate today is just 50 so we are limited in our dosage of carvedilol.  His blood pressure would tolerate higher dose of  lisinopril and I suggest that we do this but he declined to increase the dose.  We also talked about the possibility of spironolactone, which would be my last choice of meds to add.  I spoke extensively about the possibility of him starting Jardiance.  He is not even remotely interested.  He would prefer not to take any more medications and he does not have insurance for drug coverage.  Fortunately, he has remained stable and asymptomatic for more than a decade.  3.  Hyperlipidemia.  Lipid profile is adequately controlled.  In the past we have suggested going up on Lipitor but he has declined that as well.  4.  History of hypertension.  Low blood pressure has actually been an issue with his medications but his blood pressure has drifted higher in recent years.  Not worried about his blood pressure being too high, but he certainly should tolerate a higher dose of his lisinopril.  Mentioned above, he declined.    Thank you for inviting me to participate in the care of your patient.  Please don't hesitate to call if I can be of further assistance.  35 minutes were spent today reviewing the chart and other records, interviewing and examining the patient, and documenting our visit.    Chart documentation was completed, in part, with [employer] voice-recognition software. Even though reviewed, some grammatical, spelling, and word errors may remain.       Orders Placed This Encounter   Procedures    Follow-Up with Cardiologist     Orders Placed This Encounter   Medications    atorvastatin (LIPITOR) 40 MG tablet     Sig: Take 1 tablet (40 mg) by mouth daily     Dispense:  90 tablet     Refill:  3    carvedilol (COREG) 6.25 MG tablet     Sig: Take 1 tablet (6.25 mg) by mouth 2 times daily     Dispense:  180 tablet     Refill:  3    lisinopril (ZESTRIL) 2.5 MG tablet     Sig: Take 1 tablet (2.5 mg) by mouth daily     Dispense:  90 tablet     Refill:  3     Medications Discontinued During This Encounter   Medication Reason     lisinopril (ZESTRIL) 2.5 MG tablet Reorder (No AVS)    carvedilol (COREG) 6.25 MG tablet Reorder (No AVS)    atorvastatin (LIPITOR) 40 MG tablet Reorder (No AVS)       10 year ASCVD risk: The ASCVD Risk score (Yaquelin BANKS, et al., 2019) failed to calculate for the following reasons:    The 2019 ASCVD risk score is only valid for ages 40 to 79    The patient has a prior MI or stroke diagnosis    Encounter Diagnoses   Name Primary?    Coronary artery disease involving native coronary artery of native heart without angina pectoris     Pure hypercholesterolemia     Coronary artery disease involving native heart without angina pectoris, unspecified vessel or lesion type     AMI anterior wall (H)     Cardiomyopathy, ischemic Yes       CURRENT MEDICATIONS:  Current Outpatient Medications   Medication Sig Dispense Refill    ampicillin (PRINCIPEN) 500 MG capsule Take 500 mg by mouth 4 tablets prior to dental appointments      aspirin EC 81 MG EC tablet Take 1 tablet by mouth daily. 90 tablet 3    atorvastatin (LIPITOR) 40 MG tablet Take 1 tablet (40 mg) by mouth daily 90 tablet 3    carvedilol (COREG) 6.25 MG tablet Take 1 tablet (6.25 mg) by mouth 2 times daily 180 tablet 3    lisinopril (ZESTRIL) 2.5 MG tablet Take 1 tablet (2.5 mg) by mouth daily 90 tablet 3    nitroglycerin (NITROSTAT) 0.4 MG SL tablet Place 1 tablet (0.4 mg) under the tongue every 5 minutes as needed for chest pain 25 tablet 3    tamsulosin (FLOMAX) 0.4 MG capsule Take 0.4 mg by mouth daily         ALLERGIES     Allergies   Allergen Reactions    Clindamycin Hcl Rash    Hydrocodone-Acetaminophen Itching    Warfarin Rash       PAST MEDICAL HISTORY:  Past Medical History:   Diagnosis Date    AMI anterior wall (H) 6/12/2012    CAD (coronary artery disease) 6/12/2012 6/2012 - Anterior MI with Thrombectomy, PCI and JULIA to LAD 7/2012 - PCI and JULIA to LCx     Cardiomyopathy, ischemic     Coronary artery disease     MI 6/12/12 with thrombectomy and drug eluding  STENT to LAD    Hydrocele     Hyperlipidaemia     Hyperlipidemia      Diagnosis updated by automated process. Provider to review and confirm.    Myocardial infarction (H) 2012    Anterior    S/P primary angioplasty with coronary stent 2012       PAST SURGICAL HISTORY:  Past Surgical History:   Procedure Laterality Date    HEART CATH, ANGIOPLASTY  2012    JULIA stenting to LAD    HEART CATH, ANGIOPLASTY  2012    taged cath JULIA stenting to CFX    HERNIA REPAIR      ORTHOPEDIC SURGERY      Left hip replacement       FAMILY HISTORY:  Family History   Problem Relation Age of Onset    Hypertension Father     Prostate Cancer Father     Ovarian Cancer Mother        SOCIAL HISTORY:  Social History     Socioeconomic History    Marital status:      Spouse name: None    Number of children: None    Years of education: None    Highest education level: None   Tobacco Use    Smoking status: Former     Current packs/day: 0.00     Types: Cigarettes     Quit date: 2012     Years since quittin.4    Smokeless tobacco: Former    Tobacco comments:     Vaping currently   Substance and Sexual Activity    Alcohol use: Yes     Alcohol/week: 0.0 standard drinks of alcohol     Comment: occasionally    Drug use: No    Sexual activity: Yes     Partners: Female   Other Topics Concern    Caffeine Concern No     Comment: 2-3 cups per day    Stress Concern No    Special Diet Yes     Comment: trying to eat healthier - lower carbs    Exercise Yes     Comment: walking-  3-4 times per week     Social Determinants of Health      [facility]    Social Connections       Review of Systems:  Skin:  not assessed     Eyes:  not assessed    ENT:  not assessed    Respiratory:  Negative    Cardiovascular:  Negative    Gastroenterology: not assessed    Genitourinary:  not assessed    Musculoskeletal:  not assessed    Neurologic:  not assessed    Psychiatric:  not assessed    Heme/Lymph/Imm:   "not assessed    Endocrine:  not assessed      Physical Exam:  Vitals: /62 (BP Location: Right arm, Patient Position: Sitting, Cuff Size: Adult Large)   Pulse 50   Ht 1.727 m (5' 8\")   Wt 105.6 kg (232 lb 14.4 oz)   SpO2 93%   BMI 35.41 kg/m      Constitutional:  cooperative, alert and oriented, well developed, well nourished, in no acute distress obese      Skin:  warm and dry to the touch, no apparent skin lesions or masses noted        Head:  normocephalic, no masses or lesions        Eyes:  pupils equal and round, conjunctivae and lids unremarkable, sclera white, no xanthalasma, EOMS intact, no nystagmus        ENT:  no pallor or cyanosis, dentition good        Neck:  carotid pulses are full and equal bilaterally, JVP normal, no carotid bruit        Chest:  normal breath sounds, clear to auscultation, normal A-P diameter, normal symmetry, normal respiratory excursion, no use of accessory muscles        Cardiac: regular rhythm, normal S1/S2, no S3 or S4, apical impulse not displaced, no murmurs, gallops or rubs                  Abdomen:  abdomen soft;BS normoactive obese      Vascular: pulses full and equal                                      Extremities and Muscular Skeletal:        bilateral LE edema;trace     Neurological:  no gross motor deficits        Psych:  affect appropriate, oriented to time, person and place     Recent Lab Results:  LIPID RESULTS:  Lab Results   Component Value Date    CHOL 134 11/08/2023    CHOL 124 04/08/2021    HDL 39 (L) 11/08/2023    HDL 39 (L) 04/08/2021    LDL 68 11/08/2023    LDL 55 04/08/2021    TRIG 135 11/08/2023    TRIG 149 04/08/2021    CHOLHDLRATIO 3.9 10/29/2015       LIVER ENZYME RESULTS:  Lab Results   Component Value Date    ALT 18 11/08/2023    ALT 22 04/09/2020       CBC RESULTS:  Lab Results   Component Value Date    WBC 5.5 07/02/2012    RBC 4.51 07/02/2012    HGB 14.8 07/02/2012    HCT 43.3 07/02/2012    MCV 96 07/02/2012    MCH 32.8 07/02/2012    MCHC " "34.2 07/02/2012    RDW 13.0 07/02/2012     07/02/2012       BMP RESULTS:  Lab Results   Component Value Date     05/01/2023     04/08/2021    POTASSIUM 4.1 05/01/2023    POTASSIUM 4.1 05/12/2022    POTASSIUM 4.2 04/08/2021    CHLORIDE 103 05/01/2023    CHLORIDE 108 05/12/2022    CHLORIDE 107 04/08/2021    CO2 26 05/01/2023    CO2 27 05/12/2022    CO2 29 04/08/2021    ANIONGAP 8 05/01/2023    ANIONGAP 3 05/12/2022    ANIONGAP 2 (L) 04/08/2021     (H) 05/01/2023     (H) 05/12/2022     (H) 04/08/2021    BUN 10.3 05/01/2023    BUN 10 05/12/2022    BUN 7 04/08/2021    CR 0.88 05/01/2023    CR 0.88 04/08/2021    GFRESTIMATED 87 05/01/2023    GFRESTIMATED 83 04/08/2021    GFRESTBLACK >90 04/08/2021    JENNIFER 9.1 05/01/2023    JENNIFER 8.6 04/08/2021        A1C RESULTS:  No results found for: \"A1C\"    INR RESULTS:  Lab Results   Component Value Date    INR 1.03 07/02/2012    INR 1.03 06/11/2012         Dakota Yee MD, [facility]    CC  TRINIDAD TolbertC  [address] [place] [address]    Thank you for allowing me to participate in the care of your patient.      Sincerely,     Dakota Yee MD     [facility]  "

## 2024-08-04 ENCOUNTER — HEALTH MAINTENANCE LETTER (OUTPATIENT)
Age: 79
End: 2024-08-04

## 2025-06-26 DIAGNOSIS — I25.10 CORONARY ARTERY DISEASE INVOLVING NATIVE HEART WITHOUT ANGINA PECTORIS, UNSPECIFIED VESSEL OR LESION TYPE: ICD-10-CM

## 2025-06-26 DIAGNOSIS — I21.09 AMI ANTERIOR WALL (H): ICD-10-CM

## 2025-06-26 RX ORDER — CARVEDILOL 6.25 MG/1
6.25 TABLET ORAL 2 TIMES DAILY
Qty: 180 TABLET | Refills: 3 | Status: SHIPPED | OUTPATIENT
Start: 2025-06-26

## 2025-06-26 RX ORDER — LISINOPRIL 2.5 MG/1
2.5 TABLET ORAL DAILY
Qty: 90 TABLET | Refills: 3 | Status: SHIPPED | OUTPATIENT
Start: 2025-06-26

## 2025-06-26 RX ORDER — ATORVASTATIN CALCIUM 40 MG/1
40 TABLET, FILM COATED ORAL DAILY
Qty: 90 TABLET | Refills: 3 | Status: SHIPPED | OUTPATIENT
Start: 2025-06-26

## 2025-08-16 ENCOUNTER — HEALTH MAINTENANCE LETTER (OUTPATIENT)
Age: 80
End: 2025-08-16